# Patient Record
Sex: MALE | Race: BLACK OR AFRICAN AMERICAN | NOT HISPANIC OR LATINO | Employment: FULL TIME | URBAN - METROPOLITAN AREA
[De-identification: names, ages, dates, MRNs, and addresses within clinical notes are randomized per-mention and may not be internally consistent; named-entity substitution may affect disease eponyms.]

---

## 2019-01-28 ENCOUNTER — APPOINTMENT (OUTPATIENT)
Dept: OCCUPATIONAL MEDICINE | Facility: CLINIC | Age: 49
End: 2019-01-28

## 2019-01-28 ENCOUNTER — APPOINTMENT (OUTPATIENT)
Dept: LAB | Facility: HOSPITAL | Age: 49
End: 2019-01-28
Attending: PREVENTIVE MEDICINE

## 2019-01-28 ENCOUNTER — TRANSCRIBE ORDERS (OUTPATIENT)
Dept: OCCUPATIONAL MEDICINE | Facility: CLINIC | Age: 49
End: 2019-01-28

## 2019-01-28 DIAGNOSIS — Z02.1 PRE-EMPLOYMENT HEALTH SCREENING EXAMINATION: Primary | ICD-10-CM

## 2019-01-28 DIAGNOSIS — Z02.1 PRE-EMPLOYMENT HEALTH SCREENING EXAMINATION: ICD-10-CM

## 2019-01-28 PROCEDURE — 86765 RUBEOLA ANTIBODY: CPT

## 2019-01-28 PROCEDURE — 86735 MUMPS ANTIBODY: CPT

## 2019-01-28 PROCEDURE — 36415 COLL VENOUS BLD VENIPUNCTURE: CPT

## 2019-01-28 PROCEDURE — 86480 TB TEST CELL IMMUN MEASURE: CPT

## 2019-01-28 PROCEDURE — 86762 RUBELLA ANTIBODY: CPT

## 2019-01-28 PROCEDURE — 86787 VARICELLA-ZOSTER ANTIBODY: CPT

## 2019-01-29 LAB
MEV IGG SER QL: NORMAL
MUV IGG SER QL: NORMAL
RUBV IGG SERPL IA-ACNC: 18.7 IU/ML

## 2019-01-30 LAB
GAMMA INTERFERON BACKGROUND BLD IA-ACNC: 0.05 IU/ML
M TB IFN-G BLD-IMP: NEGATIVE
M TB IFN-G CD4+ BCKGRND COR BLD-ACNC: -0.01 IU/ML
M TB IFN-G CD4+ BCKGRND COR BLD-ACNC: -0.01 IU/ML
MITOGEN IGNF BCKGRD COR BLD-ACNC: >10 IU/ML

## 2019-01-31 LAB — VZV IGG SER IA-ACNC: NORMAL

## 2022-04-16 ENCOUNTER — APPOINTMENT (EMERGENCY)
Dept: RADIOLOGY | Facility: HOSPITAL | Age: 52
End: 2022-04-16
Payer: COMMERCIAL

## 2022-04-16 ENCOUNTER — HOSPITAL ENCOUNTER (OUTPATIENT)
Facility: HOSPITAL | Age: 52
Setting detail: OBSERVATION
Discharge: HOME | End: 2022-04-18
Attending: EMERGENCY MEDICINE | Admitting: INTERNAL MEDICINE
Payer: COMMERCIAL

## 2022-04-16 DIAGNOSIS — R55 SYNCOPE: Primary | ICD-10-CM

## 2022-04-16 DIAGNOSIS — R94.31 ABNORMAL EKG: ICD-10-CM

## 2022-04-16 DIAGNOSIS — N17.9 ACUTE KIDNEY INJURY (HCC): ICD-10-CM

## 2022-04-16 PROBLEM — D72.829 LEUKOCYTOSIS: Status: ACTIVE | Noted: 2022-04-16

## 2022-04-16 LAB
2HR DELTA HS TROPONIN: 2 NG/L
4HR DELTA HS TROPONIN: 3 NG/L
ALBUMIN SERPL BCP-MCNC: 3.8 G/DL (ref 3.5–5)
ALP SERPL-CCNC: 77 U/L (ref 46–116)
ALT SERPL W P-5'-P-CCNC: 41 U/L (ref 12–78)
ANION GAP SERPL CALCULATED.3IONS-SCNC: 7 MMOL/L (ref 4–13)
APTT PPP: 25 SECONDS (ref 23–37)
AST SERPL W P-5'-P-CCNC: 32 U/L (ref 5–45)
ATRIAL RATE: 63 BPM
ATRIAL RATE: 74 BPM
ATRIAL RATE: 75 BPM
ATRIAL RATE: 76 BPM
BACTERIA UR QL AUTO: ABNORMAL /HPF
BASOPHILS # BLD AUTO: 0.02 THOUSANDS/ΜL (ref 0–0.1)
BASOPHILS NFR BLD AUTO: 0 % (ref 0–1)
BILIRUB SERPL-MCNC: 0.42 MG/DL (ref 0.2–1)
BILIRUB UR QL STRIP: NEGATIVE
BUN SERPL-MCNC: 27 MG/DL (ref 5–25)
CALCIUM SERPL-MCNC: 9.1 MG/DL (ref 8.3–10.1)
CARDIAC TROPONIN I PNL SERPL HS: 6 NG/L
CARDIAC TROPONIN I PNL SERPL HS: 8 NG/L
CARDIAC TROPONIN I PNL SERPL HS: 9 NG/L
CHLORIDE SERPL-SCNC: 109 MMOL/L (ref 100–108)
CLARITY UR: CLEAR
CO2 SERPL-SCNC: 26 MMOL/L (ref 21–32)
COLOR UR: YELLOW
CREAT SERPL-MCNC: 1.68 MG/DL (ref 0.6–1.3)
EOSINOPHIL # BLD AUTO: 0.07 THOUSAND/ΜL (ref 0–0.61)
EOSINOPHIL NFR BLD AUTO: 1 % (ref 0–6)
ERYTHROCYTE [DISTWIDTH] IN BLOOD BY AUTOMATED COUNT: 12.7 % (ref 11.6–15.1)
GFR SERPL CREATININE-BSD FRML MDRD: 46 ML/MIN/1.73SQ M
GLUCOSE SERPL-MCNC: 94 MG/DL (ref 65–140)
GLUCOSE UR STRIP-MCNC: NEGATIVE MG/DL
HCT VFR BLD AUTO: 42.1 % (ref 36.5–49.3)
HGB BLD-MCNC: 13.8 G/DL (ref 12–17)
HGB UR QL STRIP.AUTO: NEGATIVE
HYALINE CASTS #/AREA URNS LPF: ABNORMAL /LPF
IMM GRANULOCYTES # BLD AUTO: 0.04 THOUSAND/UL (ref 0–0.2)
IMM GRANULOCYTES NFR BLD AUTO: 0 % (ref 0–2)
INR PPP: 1.08 (ref 0.84–1.19)
KETONES UR STRIP-MCNC: NEGATIVE MG/DL
LEUKOCYTE ESTERASE UR QL STRIP: NEGATIVE
LIPASE SERPL-CCNC: 64 U/L (ref 73–393)
LYMPHOCYTES # BLD AUTO: 0.91 THOUSANDS/ΜL (ref 0.6–4.47)
LYMPHOCYTES NFR BLD AUTO: 6 % (ref 14–44)
MCH RBC QN AUTO: 29.8 PG (ref 26.8–34.3)
MCHC RBC AUTO-ENTMCNC: 32.8 G/DL (ref 31.4–37.4)
MCV RBC AUTO: 91 FL (ref 82–98)
MONOCYTES # BLD AUTO: 1.03 THOUSAND/ΜL (ref 0.17–1.22)
MONOCYTES NFR BLD AUTO: 7 % (ref 4–12)
MUCOUS THREADS UR QL AUTO: ABNORMAL
NEUTROPHILS # BLD AUTO: 12.09 THOUSANDS/ΜL (ref 1.85–7.62)
NEUTS SEG NFR BLD AUTO: 86 % (ref 43–75)
NITRITE UR QL STRIP: NEGATIVE
NON-SQ EPI CELLS URNS QL MICRO: ABNORMAL /HPF
NRBC BLD AUTO-RTO: 0 /100 WBCS
P AXIS: 41 DEGREES
P AXIS: 43 DEGREES
P AXIS: 45 DEGREES
P AXIS: 47 DEGREES
PH UR STRIP.AUTO: 5.5 [PH]
PLATELET # BLD AUTO: 221 THOUSANDS/UL (ref 149–390)
PMV BLD AUTO: 9.6 FL (ref 8.9–12.7)
POTASSIUM SERPL-SCNC: 4.3 MMOL/L (ref 3.5–5.3)
PR INTERVAL: 170 MS
PR INTERVAL: 176 MS
PROT SERPL-MCNC: 7.5 G/DL (ref 6.4–8.2)
PROT UR STRIP-MCNC: ABNORMAL MG/DL
PROTHROMBIN TIME: 13.6 SECONDS (ref 11.6–14.5)
QRS AXIS: 29 DEGREES
QRS AXIS: 33 DEGREES
QRS AXIS: 33 DEGREES
QRS AXIS: 34 DEGREES
QRSD INTERVAL: 80 MS
QRSD INTERVAL: 82 MS
QT INTERVAL: 364 MS
QT INTERVAL: 376 MS
QT INTERVAL: 376 MS
QT INTERVAL: 388 MS
QTC INTERVAL: 397 MS
QTC INTERVAL: 409 MS
QTC INTERVAL: 417 MS
QTC INTERVAL: 419 MS
RBC # BLD AUTO: 4.63 MILLION/UL (ref 3.88–5.62)
RBC #/AREA URNS AUTO: ABNORMAL /HPF
SODIUM SERPL-SCNC: 142 MMOL/L (ref 136–145)
SP GR UR STRIP.AUTO: >=1.03 (ref 1–1.03)
T WAVE AXIS: -1 DEGREES
T WAVE AXIS: -3 DEGREES
T WAVE AXIS: -6 DEGREES
T WAVE AXIS: 1 DEGREES
UROBILINOGEN UR QL STRIP.AUTO: 0.2 E.U./DL
VENTRICULAR RATE: 63 BPM
VENTRICULAR RATE: 74 BPM
VENTRICULAR RATE: 75 BPM
VENTRICULAR RATE: 76 BPM
WBC # BLD AUTO: 14.16 THOUSAND/UL (ref 4.31–10.16)
WBC #/AREA URNS AUTO: ABNORMAL /HPF

## 2022-04-16 PROCEDURE — 99220 PR INITIAL OBSERVATION CARE/DAY 70 MINUTES: CPT | Performed by: INTERNAL MEDICINE

## 2022-04-16 PROCEDURE — 99285 EMERGENCY DEPT VISIT HI MDM: CPT | Performed by: PHYSICIAN ASSISTANT

## 2022-04-16 PROCEDURE — 83690 ASSAY OF LIPASE: CPT | Performed by: PHYSICIAN ASSISTANT

## 2022-04-16 PROCEDURE — 96360 HYDRATION IV INFUSION INIT: CPT

## 2022-04-16 PROCEDURE — 99285 EMERGENCY DEPT VISIT HI MDM: CPT

## 2022-04-16 PROCEDURE — 85025 COMPLETE CBC W/AUTO DIFF WBC: CPT | Performed by: PHYSICIAN ASSISTANT

## 2022-04-16 PROCEDURE — 81001 URINALYSIS AUTO W/SCOPE: CPT | Performed by: INTERNAL MEDICINE

## 2022-04-16 PROCEDURE — 71045 X-RAY EXAM CHEST 1 VIEW: CPT

## 2022-04-16 PROCEDURE — 93005 ELECTROCARDIOGRAM TRACING: CPT

## 2022-04-16 PROCEDURE — 85610 PROTHROMBIN TIME: CPT | Performed by: PHYSICIAN ASSISTANT

## 2022-04-16 PROCEDURE — 85730 THROMBOPLASTIN TIME PARTIAL: CPT | Performed by: PHYSICIAN ASSISTANT

## 2022-04-16 PROCEDURE — 93010 ELECTROCARDIOGRAM REPORT: CPT | Performed by: INTERNAL MEDICINE

## 2022-04-16 PROCEDURE — 84484 ASSAY OF TROPONIN QUANT: CPT | Performed by: PHYSICIAN ASSISTANT

## 2022-04-16 PROCEDURE — 36415 COLL VENOUS BLD VENIPUNCTURE: CPT | Performed by: PHYSICIAN ASSISTANT

## 2022-04-16 PROCEDURE — 80053 COMPREHEN METABOLIC PANEL: CPT | Performed by: PHYSICIAN ASSISTANT

## 2022-04-16 RX ORDER — HEPARIN SODIUM 5000 [USP'U]/ML
5000 INJECTION, SOLUTION INTRAVENOUS; SUBCUTANEOUS EVERY 8 HOURS SCHEDULED
Status: DISCONTINUED | OUTPATIENT
Start: 2022-04-16 | End: 2022-04-18 | Stop reason: HOSPADM

## 2022-04-16 RX ORDER — SODIUM CHLORIDE 9 MG/ML
100 INJECTION, SOLUTION INTRAVENOUS CONTINUOUS
Status: DISCONTINUED | OUTPATIENT
Start: 2022-04-16 | End: 2022-04-17

## 2022-04-16 RX ORDER — ASPIRIN 81 MG/1
324 TABLET, CHEWABLE ORAL ONCE
Status: COMPLETED | OUTPATIENT
Start: 2022-04-16 | End: 2022-04-16

## 2022-04-16 RX ADMIN — HEPARIN SODIUM 5000 UNITS: 5000 INJECTION INTRAVENOUS; SUBCUTANEOUS at 22:48

## 2022-04-16 RX ADMIN — SODIUM CHLORIDE 100 ML/HR: 0.9 INJECTION, SOLUTION INTRAVENOUS at 16:59

## 2022-04-16 RX ADMIN — SODIUM CHLORIDE 1000 ML: 0.9 INJECTION, SOLUTION INTRAVENOUS at 12:09

## 2022-04-16 RX ADMIN — ASPIRIN 81 MG 324 MG: 81 TABLET ORAL at 12:09

## 2022-04-16 NOTE — ASSESSMENT & PLAN NOTE
Likely reactive, no evidence of infection, afebrile  Check UA  Chest x-ray  no consolidation  Monitor CBC

## 2022-04-16 NOTE — ASSESSMENT & PLAN NOTE
No baseline creatinine  Suspect due to hypovolemia  On IV fluids  Avoid nephrotoxins/IV contrast  Monitor BMP daily

## 2022-04-16 NOTE — H&P
[address] 1970, 49 y o  male MRN: [id number]  Unit/Bed#: ED 11 Encounter: [id number]  Primary Care Provider: No primary care provider on file  Date and time admitted to hospital: 4/16/2022 11:14 AM    * Syncope  Assessment & Plan  Patient presents with abdominal pain, nausea, profuse watery BM followed by syncope while getting to vomit  Suspect orthostatic hypotension versus Arrhythmia versus vagal  -cardiac monitoring  -Trend troponin  -check orthostatic blood pressure  -Fall precaution  -IV fluids  -check echo  -cardiology consult      Leukocytosis  Assessment & Plan  Likely reactive, no evidence of infection, afebrile  Check UA  Chest x-ray  no consolidation  Monitor CBC    MARCIAL (acute kidney injury) ([facility] [address] )  Assessment & Plan  No baseline creatinine  Suspect due to hypovolemia  On IV fluids  Avoid nephrotoxins/IV contrast  Monitor BMP daily    VTE Pharmacologic Prophylaxis: VTE Score: 5 High Risk (Score >/= 5) - Pharmacological DVT Prophylaxis Ordered: heparin  Sequential Compression Devices Ordered  Code Status: Level 1 - Full Code   Discussion with family: Patient declined call to   Anticipated Length of Stay: Patient will be admitted on an observation basis with an anticipated length of stay of less than 2 midnights secondary to Syncope  Total Time for Visit, including Counseling / Coordination of Care: 45 minutes Greater than 50% of this total time spent on direct patient counseling and coordination of care  Chief Complaint:  Nausea and passed out    History of Present Illness:  Ismael Rush is a 49 y o  male with no significant past medical history who presents with syncopal episode while at work  7:30 a m  while at work, he developed upset stomach went to bathroom had a large volume loose stool BM    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, 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, developed crampy epigastric pain, became diaphoretic, visual disturbance, nauseous and while getting up to vomit passed out landed on his knees and was assisted by co-worker who called EMS  He denies chest pain, shortness a breath, fever, chills, dysuria, urinary frequency, history of syncope  Patient exercises regularly daily  Review of Systems:  Review of Systems  Comprehensive review of systems negative except in above HPI  Past Medical and Surgical History:   Past Medical History:   Diagnosis Date    No pertinent past medical history        Past Surgical History:   Procedure Laterality Date    VASECTOMY N/A        Meds/Allergies:  Prior to Admission medications    Not on File     I have reviewed home medications with patient personally  Allergies: No Known Allergies    Social History:  Marital Status:    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  Patient Pre-hospital Living Situation: With other family member: Girlfriend  Patient Pre-hospital Level of Mobility: walks  Patient Pre-hospital Diet Restrictions:   Substance Use History:   Social History     Substance and Sexual Activity   Alcohol Use Yes    Comment: occasional     Social History     Tobacco Use   Smoking Status Never Smoker   Smokeless Tobacco Never Used     Social History     Substance and Sexual Activity   Drug Use Never       Family History:  History reviewed  No pertinent family history  Physical Exam:     Vitals:   Blood Pressure: 129/77 (04/16/22 1430)  Pulse: 70 (04/16/22 1430)  Temperature: 98 1 °F (36 7 °C) (04/16/22 1115)  Temp Source: Oral (04/16/22 1115)  Respirations: 16 (04/16/22 1430)  Height: 5' 8" (172 7 cm) (04/16/22 1130)  Weight - Scale: 132 kg (290 lb) (04/16/22 1130)  SpO2: 97 % (04/16/22 1430)    Physical Exam  Vitals and nursing note reviewed  Constitutional:       Appearance: Normal appearance  He is well-developed  He is not toxic-appearing  HENT:      Head: Normocephalic and atraumatic     Eyes:      Conjunctiva/sclera: Conjunctivae normal  Pupils: Pupils are equal, round, and reactive to light  Cardiovascular:      Rate and Rhythm: Normal rate and regular rhythm  Pulses: Normal pulses  Heart sounds: Normal heart sounds  No murmur heard  Pulmonary:      Effort: Pulmonary effort is normal  No respiratory distress  Breath sounds: Normal breath sounds  Abdominal:      General: Abdomen is flat  Bowel sounds are normal  There is no distension  Palpations: Abdomen is soft  Tenderness: There is no abdominal tenderness  Musculoskeletal:      Cervical back: Neck supple  Right lower leg: No edema  Left lower leg: No edema  Skin:     General: Skin is warm and dry  Neurological:      General: No focal deficit present  Mental Status: He is alert and oriented to person, place, and time  Sensory: No sensory deficit  Motor: No weakness  Coordination: Coordination normal       Deep Tendon Reflexes: Reflexes normal       Comments: Finger-to-nose intact   Psychiatric:         Mood and Affect: Mood normal          Behavior: Behavior normal           Additional Data:     Lab Results:  Results from last 7 days   Lab Units 04/16/22  1200   WBC Thousand/uL 14 16*   HEMOGLOBIN g/dL 13 8   HEMATOCRIT % 42 1   PLATELETS Thousands/uL 221   NEUTROS PCT % 86*   LYMPHS PCT % 6*   MONOS PCT % 7   EOS PCT % 1     Results from last 7 days   Lab Units 04/16/22  1201   SODIUM mmol/L 142   POTASSIUM mmol/L 4 3   CHLORIDE mmol/L 109*   CO2 mmol/L 26   BUN mg/dL 27*   CREATININE mg/dL 1 68*   ANION GAP mmol/L 7   CALCIUM mg/dL 9 1   ALBUMIN g/dL 3 8   TOTAL BILIRUBIN mg/dL 0 42   ALK PHOS U/L 77   ALT U/L 41   AST U/L 32   GLUCOSE RANDOM mg/dL 94     Results from last 7 days   Lab Units 04/16/22  1201   INR  1 08                   Imaging: Reviewed radiology reports from this admission including: chest xray  XR chest 1 view portable    (Results Pending)       EKG and Other Studies Reviewed on Admission:   · EKG: NSR   HR 74bpm  ST elevation in I, avl, twi in twi (no old ekg for comparison)    ** Please Note: This note has been constructed using a voice recognition system   **

## 2022-04-16 NOTE — ED NOTES
Ortho BP  Laying- /57 HR 67  Sitting- /60 HR 61  Standing-/95 HR 71      [place], [address] [place]  04/16/22 1628

## 2022-04-16 NOTE — PLAN OF CARE
Problem: PAIN - ADULT  Goal: Verbalizes/displays adequate comfort level or baseline comfort level  Description: Interventions:  - Encourage patient to monitor pain and request assistance  - Assess pain using appropriate pain scale  - Administer analgesics based on type and severity of pain and evaluate response  - Implement non-pharmacological measures as appropriate and evaluate response  - Consider cultural and social influences on pain and pain management  - Notify physician/advanced practitioner if interventions unsuccessful or patient reports new pain  Outcome: Progressing     Problem: INFECTION - ADULT  Goal: Absence or prevention of progression during hospitalization  Description: INTERVENTIONS:  - Assess and monitor for signs and symptoms of infection  - Monitor lab/diagnostic results  - Monitor all insertion sites, i e  indwelling lines, tubes, and drains  - Monitor endotracheal if appropriate and nasal secretions for changes in amount and color  - [place] appropriate cooling/warming therapies per order  - Administer medications as ordered  - Instruct and encourage patient and family to use good hand hygiene technique  - Identify and instruct in appropriate isolation precautions for identified infection/condition  Outcome: Progressing  Goal: Absence of fever/infection during neutropenic period  Description: INTERVENTIONS:  - Monitor WBC    Outcome: Progressing     Problem: SAFETY ADULT  Goal: Patient will remain free of falls  Description: INTERVENTIONS:  - Educate patient/family on patient safety including physical limitations  - Instruct patient to call for assistance with activity   - Consult OT/PT to assist with strengthening/mobility   - Keep Call bell within reach  - Keep bed low and locked with side rails adjusted as appropriate  - Keep care items and personal belongings within reach  - Initiate and maintain comfort rounds  - Make Fall Risk Sign visible to staff  - Offer Toileting every 2 Hours, in advance of need  - Initiate/Maintain bed alarm  - Obtain necessary fall risk management equipment: bed   - Apply yellow socks and bracelet for high fall risk patients  - Consider moving patient to room near nurses station  Outcome: Progressing  Goal: Maintain or return to baseline ADL function  Description: INTERVENTIONS:  -  Assess patient's ability to carry out ADLs; assess patient's baseline for ADL function and identify physical deficits which impact ability to perform ADLs (bathing, care of mouth/teeth, toileting, grooming, dressing, etc )  - Assess/evaluate cause of self-care deficits   - Assess range of motion  - Assess patient's mobility; develop plan if impaired  - Assess patient's need for assistive devices and provide as appropriate  - Encourage maximum independence but intervene and supervise when necessary  - Involve family in performance of ADLs  - Assess for home care needs following discharge   - Consider OT consult to assist with ADL evaluation and planning for discharge  - Provide patient education as appropriate  Outcome: Progressing  Goal: Maintains/Returns to pre admission functional level  Description: INTERVENTIONS:  - Perform BMAT or MOVE assessment daily    - Set and communicate daily mobility goal to care team and patient/family/caregiver  - Collaborate with rehabilitation services on mobility goals if consulted  - Perform Range of Motion  times a day  - Reposition patient every 3 hours    - Dangle patient 3 times a day  - Stand patient 3 times a day  - Ambulate patient 3 times a day  - Out of bed to chair 3 times a day   - Out of bed for meals 3 times a day  - Out of bed for toileting  - Record patient progress and toleration of activity level   Outcome: Progressing     Problem: DISCHARGE PLANNING  Goal: Discharge to home or other facility with appropriate resources  Description: INTERVENTIONS:  - Identify barriers to discharge w/patient and caregiver  - Arrange for needed discharge resources and transportation as appropriate  - Identify discharge learning needs (meds, wound care, etc )  - Arrange for interpretive services to assist at discharge as needed  - Refer to Case Management Department for coordinating discharge planning if the patient needs post-hospital services based on physician/advanced practitioner order or complex needs related to functional status, cognitive ability, or social support system  Outcome: Progressing     Problem: Knowledge Deficit  Goal: Patient/family/caregiver demonstrates understanding of disease process, treatment plan, medications, and discharge instructions  Description: Complete learning assessment and assess knowledge base    Interventions:  - Provide teaching at level of understanding  - Provide teaching via preferred learning methods  Outcome: Progressing     Problem: CARDIOVASCULAR - ADULT  Goal: Maintains optimal cardiac output and hemodynamic stability  Description: INTERVENTIONS:  - Monitor I/O, vital signs and rhythm  - Monitor for S/S and trends of decreased cardiac output  - Administer and titrate ordered vasoactive medications to optimize hemodynamic stability  - Assess quality of pulses, skin color and temperature  - Assess for signs of decreased coronary artery perfusion  - Instruct patient to report change in severity of symptoms  Outcome: Progressing  Goal: Absence of cardiac dysrhythmias or at baseline rhythm  Description: INTERVENTIONS:  - Continuous cardiac monitoring, vital signs, obtain 12 lead EKG if ordered  - Administer antiarrhythmic and heart rate control medications as ordered  - Monitor electrolytes and administer replacement therapy as ordered  Outcome: Progressing     Problem: GASTROINTESTINAL - ADULT  Goal: Minimal or absence of nausea and/or vomiting  Description: INTERVENTIONS:  - Administer IV fluids if ordered to ensure adequate hydration  - Maintain NPO status until nausea and vomiting are resolved  - Nasogastric tube if ordered  - Administer ordered antiemetic medications as needed  - Provide nonpharmacologic comfort measures as appropriate  - Advance diet as tolerated, if ordered  - Consider nutrition services referral to assist patient with adequate nutrition and appropriate food choices  Outcome: Progressing  Goal: Maintains or returns to baseline bowel function  Description: INTERVENTIONS:  - Assess bowel function  - Encourage oral fluids to ensure adequate hydration  - Administer IV fluids if ordered to ensure adequate hydration  - Administer ordered medications as needed  - Encourage mobilization and activity  - Consider nutritional services referral to assist patient with adequate nutrition and appropriate food choices  Outcome: Progressing

## 2022-04-17 LAB
ANION GAP SERPL CALCULATED.3IONS-SCNC: 7 MMOL/L (ref 4–13)
BUN SERPL-MCNC: 21 MG/DL (ref 5–25)
CALCIUM SERPL-MCNC: 8.5 MG/DL (ref 8.3–10.1)
CHLORIDE SERPL-SCNC: 109 MMOL/L (ref 100–108)
CHOLEST SERPL-MCNC: 212 MG/DL
CO2 SERPL-SCNC: 24 MMOL/L (ref 21–32)
CREAT SERPL-MCNC: 1.34 MG/DL (ref 0.6–1.3)
ERYTHROCYTE [DISTWIDTH] IN BLOOD BY AUTOMATED COUNT: 12.8 % (ref 11.6–15.1)
EST. AVERAGE GLUCOSE BLD GHB EST-MCNC: 108 MG/DL
GFR SERPL CREATININE-BSD FRML MDRD: 61 ML/MIN/1.73SQ M
GLUCOSE SERPL-MCNC: 106 MG/DL (ref 65–140)
HBA1C MFR BLD: 5.4 %
HCT VFR BLD AUTO: 39.8 % (ref 36.5–49.3)
HDLC SERPL-MCNC: 66 MG/DL
HGB BLD-MCNC: 13.3 G/DL (ref 12–17)
LDLC SERPL CALC-MCNC: 129 MG/DL (ref 0–100)
MCH RBC QN AUTO: 30.2 PG (ref 26.8–34.3)
MCHC RBC AUTO-ENTMCNC: 33.4 G/DL (ref 31.4–37.4)
MCV RBC AUTO: 91 FL (ref 82–98)
NONHDLC SERPL-MCNC: 146 MG/DL
PLATELET # BLD AUTO: 196 THOUSANDS/UL (ref 149–390)
PMV BLD AUTO: 9.5 FL (ref 8.9–12.7)
POTASSIUM SERPL-SCNC: 4 MMOL/L (ref 3.5–5.3)
RBC # BLD AUTO: 4.4 MILLION/UL (ref 3.88–5.62)
SODIUM SERPL-SCNC: 140 MMOL/L (ref 136–145)
TRIGL SERPL-MCNC: 87 MG/DL
WBC # BLD AUTO: 8.48 THOUSAND/UL (ref 4.31–10.16)

## 2022-04-17 PROCEDURE — 99226 PR SBSQ OBSERVATION CARE/DAY 35 MINUTES: CPT | Performed by: FAMILY MEDICINE

## 2022-04-17 PROCEDURE — 80048 BASIC METABOLIC PNL TOTAL CA: CPT | Performed by: INTERNAL MEDICINE

## 2022-04-17 PROCEDURE — 83036 HEMOGLOBIN GLYCOSYLATED A1C: CPT | Performed by: INTERNAL MEDICINE

## 2022-04-17 PROCEDURE — 85027 COMPLETE CBC AUTOMATED: CPT | Performed by: INTERNAL MEDICINE

## 2022-04-17 PROCEDURE — 80061 LIPID PANEL: CPT | Performed by: INTERNAL MEDICINE

## 2022-04-17 PROCEDURE — 99205 OFFICE O/P NEW HI 60 MIN: CPT | Performed by: INTERNAL MEDICINE

## 2022-04-17 RX ADMIN — HEPARIN SODIUM 5000 UNITS: 5000 INJECTION INTRAVENOUS; SUBCUTANEOUS at 07:02

## 2022-04-17 RX ADMIN — HEPARIN SODIUM 5000 UNITS: 5000 INJECTION INTRAVENOUS; SUBCUTANEOUS at 22:06

## 2022-04-17 RX ADMIN — HEPARIN SODIUM 5000 UNITS: 5000 INJECTION INTRAVENOUS; SUBCUTANEOUS at 13:19

## 2022-04-17 RX ADMIN — SODIUM CHLORIDE 100 ML/HR: 0.9 INJECTION, SOLUTION INTRAVENOUS at 03:50

## 2022-04-17 NOTE — ASSESSMENT & PLAN NOTE
· Orthostatics normal  · Telemetry shows no arrhythmias  · Cardiology planning for stress test tomorrow  · Pending echo  · Continue telemetry

## 2022-04-17 NOTE — UTILIZATION REVIEW
Initial Clinical Review    Admission: Date/Time/Statement:   Admission Orders (From admission, onward)     Ordered        04/16/22 1258  Place in Observation  Once                      Orders Placed This Encounter   Procedures    Place in Observation     Standing Status:   Standing     Number of Occurrences:   1     Order Specific Question:   Level of Care     Answer:   Med Surg [16]     ED Arrival Information     Expected Arrival Acuity    - 4/16/2022 11:13 Urgent         Means of arrival Escorted by Service Admission type    Ambulance [facility]) Hospitalist Urgent         Arrival complaint    ABD PAIN        Chief Complaint   Patient presents with    Abdominal Pain     pt brought in EMS for mid lower abdominal pain, nausea, and light headedness        Initial Presentation: 49 y o  male  with no significant PMH who presents to the ED s/p syncopal episode while at work  This morning while at work, he developed an upset stomach, had a large volume loose stool BM  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, 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, developed crampy epigastric pain, became diaphoretic with visual disturbance, stood up and  landed on his knees  4/16 Plan: Admit to Observation for evaluation and treatment of syncope and MARCIAL:  Suspect orthostatic hypotension vs  Arrhythmia vs  Vagal:  cardiac monitoring, check orthostatic blood pressure, IV fluids, ECHO, consult Cardiology  No baseline creatinine available, monitor BMP daily  4/17 Cardiology consult:  Syncope, likely vasovagal in the setting of nausea/diarrhea  Check orthostatic blood pressures, TTE ordered and pending, exercise stress test to be performed tomorrow  Internal Medicine: No further syncopal events  Orthostatics normal, MARCIAL improving  Continue telemetry           ED Triage Vitals   Temperature Pulse Respirations Blood Pressure SpO2   04/16/22 1115 04/16/22 1115 04/16/22 1115 04/16/22 1115 04/16/22 1115   98 1 °F (36 7 °C) 84 18 117/70 97 %      Temp Source Heart Rate Source Patient Position - Orthostatic VS BP Location FiO2 (%)   04/16/22 1115 04/16/22 1115 04/16/22 1115 04/16/22 1115 --   Oral Monitor Lying Right arm       Pain Score       04/16/22 1315       3          Wt Readings from Last 1 Encounters:   04/16/22 132 kg (290 lb)     Additional Vital Signs:       Date/Time Temp Pulse Resp BP MAP (mmHg) SpO2 O2 Device Patient Position - Orthostatic VS   04/17/22 15:05:51 98 2 °F (36 8 °C) 75 17 119/72 88 93 % -- --   04/17/22 11:21:43 97 9 °F (36 6 °C) 68 16 111/78 89 94 % -- --       Date/Time Temp Pulse Resp BP MAP (mmHg) SpO2 O2 Device Patient Position - Orthostatic VS   04/17/22 1006 98 9 °F (37 2 °C) -- 20 142/86 -- 96 % -- Standing - Orthostatic VS   04/17/22 1005 98 9 °F (37 2 °C) -- 18 134/80 89 97 % -- Sitting - Orthostatic VS   04/17/22 1002 98 9 °F (37 2 °C) -- 17 135/81 92 92 % -- Lying - Orthostatic VS   04/17/22 09:59:04 -- 75 20 142/86 105 94 % -- --   04/17/22 07:31:15 98 5 °F (36 9 °C) 57 21 120/82 95 96 % -- --   04/17/22 0411 -- -- -- -- -- 96 % -- Lying   04/17/22 03:15:06 -- 73 20 152/92 112 95 % -- --   04/16/22 1930 -- -- -- -- -- 97 % None (Room air) --   04/16/22 19:17:03 98 1 °F (36 7 °C) 71 -- 154/84 107 96 % -- --   04/16/22 18:21:01 98 7 °F (37 1 °C) 64 17 154/82 106 97 % -- --   04/16/22 1430 -- 70 16 129/77 97 97 % None (Room air) Lying   04/16/22 1415 -- 77 18 130/76 98 99 % None (Room air) Lying   04/16/22 1400 -- 81 18 131/74 97 97 % None (Room air) Lying   04/16/22 1345 -- 72 14 132/72 97 98 % None (Room air) Lying   04/16/22 1330 -- 66 16 128/71 95 98 % None (Room air) Lying   04/16/22 1315 -- 59 12 124/64 89 100 % None (Room air) Lying   04/16/22 1245 -- 68 15 116/66 85 98 % None (Room air) Lying   04/16/22 1230 -- 81 14 125/66 90 98 % None (Room air) Lying   04/16/22 1215 -- 67 14 123/58 84 100 % None (Room air) Lying   04/16/22 1130 -- 84 18 111/61 76 98 % None (Room air) Lying     1622 ED Ortho BP  Laying- /57 HR 67  Sitting- /60 HR 61  Standing-/95 HR 71         Pertinent Labs/Diagnostic Test Results:   XR chest 1 view portable   Final Result by Marcy Burciaga MD (04/17 0840)      No acute cardiopulmonary disease                    Workstation performed: [id number]             4/16 repeat EKG:    Normal sinus rhythm with sinus arrhythmia  ST elevation, consider early repolarization, pericarditis, or injury  Abnormal ECG  When compared with ECG of 16-APR-2022 11:50, (unconfirmed)  No significant change was found    4/16 EKG:      Normal sinus rhythm  ST elevation, consider early repolarization, pericarditis, or injury  No previous ECGs available          Results from last 7 days   Lab Units 04/17/22  0433 04/16/22  1200   WBC Thousand/uL 8 48 14 16*   HEMOGLOBIN g/dL 13 3 13 8   HEMATOCRIT % 39 8 42 1   PLATELETS Thousands/uL 196 221   NEUTROS ABS Thousands/µL  --  12 09*         Results from last 7 days   Lab Units 04/17/22  0433 04/16/22  1201   SODIUM mmol/L 140 142   POTASSIUM mmol/L 4 0 4 3   CHLORIDE mmol/L 109* 109*   CO2 mmol/L 24 26   ANION GAP mmol/L 7 7   BUN mg/dL 21 27*   CREATININE mg/dL 1 34* 1 68*   EGFR ml/min/1 73sq m 61 46   CALCIUM mg/dL 8 5 9 1     Results from last 7 days   Lab Units 04/16/22  1201   AST U/L 32   ALT U/L 41   ALK PHOS U/L 77   TOTAL PROTEIN g/dL 7 5   ALBUMIN g/dL 3 8   TOTAL BILIRUBIN mg/dL 0 42         Results from last 7 days   Lab Units 04/17/22  0433 04/16/22  1201   GLUCOSE RANDOM mg/dL 106 94         Results from last 7 days   Lab Units 04/16/22  1625 04/16/22  1436 04/16/22  1201   HS TNI 0HR ng/L  --   --  6   HS TNI 2HR ng/L  --  8  --    HSTNI D2 ng/L  --  2  --    HS TNI 4HR ng/L 9  --   --    HSTNI D4 ng/L 3  --   --          Results from last 7 days   Lab Units 04/16/22  1201   PROTIME seconds 13 6   INR  1 08   PTT seconds 25         Results from last 7 days   Lab Units 04/16/22  1201   LIPASE u/L 64*                 Results from last 7 days   Lab Units 04/16/22  1701   CLARITY UA  Clear   COLOR UA  Yellow   SPEC GRAV UA  >=1 030   PH UA  5 5   GLUCOSE UA mg/dl Negative   KETONES UA mg/dl Negative   BLOOD UA  Negative   PROTEIN UA mg/dl Trace*   NITRITE UA  Negative   BILIRUBIN UA  Negative   UROBILINOGEN UA E U /dl 0 2   LEUKOCYTES UA  Negative   WBC UA /hpf 0-1   RBC UA /hpf None Seen   BACTERIA UA /hpf None Seen   EPITHELIAL CELLS WET PREP /hpf None Seen   MUCUS THREADS  Occasional*         ED Treatment:   Medication Administration from 04/16/2022 1112 to 04/16/2022 1811       Date/Time Order Dose Route Action     04/16/2022 1625 sodium chloride 0 9 % bolus 1,000 mL 0 mL Intravenous Stopped     04/16/2022 1209 sodium chloride 0 9 % bolus 1,000 mL 1,000 mL Intravenous New Bag     04/16/2022 1209 aspirin chewable tablet 324 mg 324 mg Oral Given     04/16/2022 1702 heparin (porcine) subcutaneous injection 5,000 Units 5,000 Units Subcutaneous Not Given     04/16/2022 1659 sodium chloride 0 9 % infusion 100 mL/hr Intravenous New Bag        Past Medical History:   Diagnosis Date    No pertinent past medical history        Admitting Diagnosis: Syncope [R55]  Abdominal pain [R10 9]  Abnormal EKG [R94 31]  Acute kidney injury ([facility] [address] ) [N17 9]  Age/Sex: 49 y o  male       Admission Orders:    Telemetry, orthostatic blood pressure, SCD  Scheduled Medications:  heparin (porcine), 5,000 Units, Subcutaneous, Q[address]H [address]      Continuous IV Infusions:    sodium chloride 0 9 % infusion  Rate: 100 mL/hr Dose: 100 mL/hr  Freq: Continuous Route: IV  Indications of Use: IV Hydration  Start: 04/16/22 1630 End: 04/17/22 1012         PRN Meds: None  IP CONSULT TO CARDIOLOGY    Network Utilization Review Department  ATTENTION: Please call with any questions or concerns to [phone] and carefully listen to the prompts so that you are directed to the right person   All voicemails are confidential   Phuong Olivia all requests for admission clinical reviews, approved or denied determinations and any other requests to dedicated fax number below belonging to the campus where the patient is receiving treatment   List of dedicated fax numbers for the Facilities:  [address] [place] [address] DENIALS (Administrative/Medical Necessity) [phone]   [address]  [address] (Maternity/NICU/Pediatrics) [phone] [address]  [address] [facility] [place] [address] [place] [address] [place] [address] [address] [facility] [place] [address] [phone]

## 2022-04-17 NOTE — PROGRESS NOTES
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 is a 49y o  year old male who has no significant past medical who presented to the emergency room yesterday afternoon with complaints of a syncopal episode while at work the morning of admission  Patient stated that he developed an upset stomach went to the bathroom and had a large volume, loose bowel movement and after returning to work became nauseous with epigastric pain, diaphoretic and while getting up to go to the bathroom, lost consciousness  He denies CP, SOB or other recent syncope episodes  EMS was summoned and he was brought to the emergency room for further evaluation  He states he exercises regularly for 409 minutes of cardio and has no symptoms while exercising  In the emergency room his creatinine was noted to be elevated at 1 6, has since improved to 1 3 today  Troponin negative x3  EKG revealed normal sinus rhythm with ST elevations consistent with pericarditis early repolarization      REVIEW OF SYSTEMS:  Constitutional:  Denies fever or chills, +syncope   Eyes:  Denies change in visual acuity   HENT:  Denies nasal congestion or sore throat   Respiratory:  Denies cough, orthopnea, PND or shortness of breath   Cardiovascular:  Denies chest pain, palpitations or edema   GI:  + abdominal pain, nausea, vomiting, and diarrhea, denies bloody stools   :  Denies dysuria, frequency, difficulty in micturition and nocturia  Musculoskeletal:  Denies back pain or joint pain   Neurologic:  Denies headache, focal weakness or sensory changes   Endocrine:  Denies polyuria or polydipsia   Lymphatic:  Denies swollen glands   Psychiatric:  Denies depression or anxiety     Historical Information   Past Medical History:   Diagnosis Date    No pertinent past medical history      Past Surgical History:   Procedure Laterality Date    VASECTOMY N/A      Social History     Substance and Sexual Activity   Alcohol Use Yes    Comment: occasional     Social History     Substance and Sexual Activity   Drug Use Never     Social History     Tobacco Use   Smoking Status Never Smoker   Smokeless Tobacco Never Used       Family History: History reviewed  No pertinent family history  MEDS & ALLERGIES:  all current active meds have been reviewed and current meds:   Current Facility-Administered Medications   Medication Dose Route Frequency    heparin (porcine) subcutaneous injection 5,000 Units  5,000 Units Subcutaneous Q8H [address]    sodium chloride 0 9 % infusion  100 mL/hr Intravenous Continuous     sodium chloride, 100 mL/hr, Last Rate: 100 mL/hr (22 0350)      No Known Allergies    OBJECTIVE:  Vitals:   Vitals:    22 0731   BP: 120/82   Pulse: 57   Resp: 21   Temp: 98 5 °F (36 9 °C)   SpO2: 96%     Body mass index is 44 09 kg/m²      Systolic (18VLW), RKH:773 , Min:111 , KT     Diastolic (79BCY), PHQ:05, Min:58, Max:92      Intake/Output Summary (Last 24 hours) at 2022 0806  Last data filed at 2022 0411  Gross per 24 hour   Intake 3103 33 ml   Output 1225 ml   Net 1878 33 ml     Weight (last 2 days)     Date/Time Weight    22 1130 132 (290)        Invasive Devices  Report    Peripheral Intravenous Line            Peripheral IV 22 Left Antecubital <1 day                PHYSICAL EXAMS:  General:  Patient is not in acute distress, laying in the bed comfortably, awake, alert   Head: Normocephalic, Atraumatic  HEENT: White sclera, pink conjunctiva  Neck:  Supple, no neck vein distention  Respiratory: clear to auscultation   Cardiovascular:  PMI normal, S1-S2 normal, no murmurs, thrills, gallops, rubs, regular rhythm  GI:  Abdomen soft, non-tender, non-distended  Extremities: No edema, normal pulses  Integument:  No skin rashes or ulceration  Lymphatic:  No cervical or inguinal lymphadenopathy  Neurologic:  Patient is awake alert, responding to command, oriented to person, place and time     LABORATORY RESULTS:      CBC with diff:   Results from last 7 days   Lab Units 04/17/22  0433 04/16/22  1200   WBC Thousand/uL 8 48 14 16*   HEMOGLOBIN g/dL 13 3 13 8   HEMATOCRIT % 39 8 42 1   MCV fL 91 91   PLATELETS Thousands/uL 196 221   MCH pg 30 2 29 8   MCHC g/dL 33 4 32 8   RDW % 12 8 12 7   MPV fL 9 5 9 6   NRBC AUTO /100 WBCs  --  0       CMP:  Results from last 7 days   Lab Units 04/17/22  0433 04/16/22  1201   POTASSIUM mmol/L 4 0 4 3   CHLORIDE mmol/L 109* 109*   CO2 mmol/L 24 26   BUN mg/dL 21 27*   CREATININE mg/dL 1 34* 1 68*   CALCIUM mg/dL 8 5 9 1   AST U/L  --  32   ALT U/L  --  41   ALK PHOS U/L  --  77   EGFR ml/min/1 73sq m 61 46       BMP:  Results from last 7 days   Lab Units 04/17/22  0433 04/16/22  1201   POTASSIUM mmol/L 4 0 4 3   CHLORIDE mmol/L 109* 109*   CO2 mmol/L 24 26   BUN mg/dL 21 27*   CREATININE mg/dL 1 34* 1 68*   CALCIUM mg/dL 8 5 9 1                        Results from last 7 days   Lab Units 04/16/22  1201   INR  1 08       Lipid Profile:   No results found for: CHOL  Lab Results   Component Value Date    HDL 66 04/17/2022     Lab Results   Component Value Date    LDLCALC 129 (H) 04/17/2022     Lab Results   Component Value Date    TRIG 87 04/17/2022       Cardiac testing:   No results found for this or any previous visit  No results found for this or any previous visit  No valid procedures specified    No results found for this or any previous visit         Imaging:   I have personally reviewed pertinent reports          EKG reviewed personally:  Normal sinus rhythm with ST elevations consistent with early repolarization or pericarditis    Telemetry reviewed personally:   Sinus rhythm in the 60's       Code Status: Level 1 - Full Code      NIYAH Greenwood  4/17/2022,8:06 AM

## 2022-04-17 NOTE — PROGRESS NOTES
[facility]  Progress Note - [address] 1970, 49 y o  male MRN: [id number]  Unit/Bed#: -Zach Encounter: [id number]  Primary Care Provider: No primary care provider on file  Date and time admitted to hospital: 2022 11:14 AM    * Syncope  Assessment & Plan  · Orthostatics normal  · Telemetry shows no arrhythmias  · Cardiology planning for stress test tomorrow  · Pending echo  · Continue telemetry        Leukocytosis  Assessment & Plan  Resolved    MARCIAL (acute kidney injury) ([address] )  Assessment & Plan  No baseline creatinine  Improving  Continue IV fluids          VTE Pharmacologic Prophylaxis: VTE Score: 5 Heparin    Patient Centered Rounds: I performed bedside rounds with nursing staff today  Discussions with Specialists or Other Care Team Provider:  Yes, Cardiology    Education and Discussions with Family / Patient: Patient will update his family  Time Spent for Care: 15 minutes  More than 50% of total time spent on counseling and coordination of care as described above  Current Length of Stay: 0 day(s)  Current Patient Status: Observation   Certification Statement: The patient will continue to require additional inpatient hospital stay due to Need for further cardiac workup  Discharge Plan: Anticipate discharge in 24-48 hrs to home  Code Status: Level 1 - Full Code    Subjective:   Seen and examined at bedside  No new complaints  No further syncopal events    Objective:     Vitals:   Temp (24hrs), Av 6 °F (37 °C), Min:97 9 °F (36 6 °C), Max:98 9 °F (37 2 °C)    Temp:  [97 9 °F (36 6 °C)-98 9 °F (37 2 °C)] 97 9 °F (36 6 °C)  HR:  [57-75] 68  Resp:  [16-21] 16  BP: (111-154)/(77-92) 111/78  SpO2:  [92 %-97 %] 94 %  Body mass index is 44 09 kg/m²  Input and Output Summary (last 24 hours):      Intake/Output Summary (Last 24 hours) at 2022 1422  Last data filed at 2022 1300  Gross per 24 hour   Intake 3343 33 ml   Output 1225 ml   Net 2118 33 ml Physical Exam:   Physical Exam General- Awake, alert and oriented x 3, looks comfortable  HEENT- Normocephalic, atraumatic, oral mucosa- moist  Neck- Supple, No carotid bruit, no JVD  CVS- Normal S1/ S2, Regular rate and rhythm, No murmur, No edema  Respiratory system- B/L clear breath sounds, no wheezing  Abdomen- Soft, Non distended, no tenderness, Bowel sound- present 4 quads  Genitourinary- No suprapubic tenderness, No CVA tenderness  Skin- No new bruise or rash  Musculoskeletal- No gross deformity  Psych- No acute psychosis  CNS- CN II- XII grossly intact, No acute focal neurologic deficit noted      Additional Data:     Labs:  Results from last 7 days   Lab Units 04/17/22  0433 04/16/22  1200 04/16/22  1200   WBC Thousand/uL 8 48   < > 14 16*   HEMOGLOBIN g/dL 13 3   < > 13 8   HEMATOCRIT % 39 8   < > 42 1   PLATELETS Thousands/uL 196   < > 221   NEUTROS PCT %  --   --  86*   LYMPHS PCT %  --   --  6*   MONOS PCT %  --   --  7   EOS PCT %  --   --  1    < > = values in this interval not displayed  Results from last 7 days   Lab Units 04/17/22  0433 04/16/22  1201 04/16/22  1201   SODIUM mmol/L 140   < > 142   POTASSIUM mmol/L 4 0   < > 4 3   CHLORIDE mmol/L 109*   < > 109*   CO2 mmol/L 24   < > 26   BUN mg/dL 21   < > 27*   CREATININE mg/dL 1 34*   < > 1 68*   ANION GAP mmol/L 7   < > 7   CALCIUM mg/dL 8 5   < > 9 1   ALBUMIN g/dL  --   --  3 8   TOTAL BILIRUBIN mg/dL  --   --  0 42   ALK PHOS U/L  --   --  77   ALT U/L  --   --  41   AST U/L  --   --  32   GLUCOSE RANDOM mg/dL 106   < > 94    < > = values in this interval not displayed       Results from last 7 days   Lab Units 04/16/22  1201   INR  1 08         Results from last 7 days   Lab Units 04/17/22  0433   HEMOGLOBIN A1C % 5 4           Lines/Drains:  Invasive Devices  Report    Peripheral Intravenous Line            Peripheral IV 04/16/22 Left Antecubital 1 day                  Telemetry:  Telemetry Orders (From admission, onward) 24 Hour Telemetry Monitoring  Continuous x 24 Hours (Telem)           References:    Telemetry Guidelines   Question:  Reason for 24 Hour Telemetry  Answer:  Syncope suspected to be cardiac in origin                 Telemetry Reviewed: Normal Sinus Rhythm  Indication for Continued Telemetry Use: Syncope             Imaging: No pertinent imaging reviewed  Recent Cultures (last 7 days):         Last 24 Hours Medication List:   Current Facility-Administered Medications   Medication Dose Route Frequency Provider Last Rate    heparin (porcine)  5,000 Units Subcutaneous [facility] Bertram Rivera MD          Today, Patient Was Seen By: Ha Mcgraw MD    **Please Note: This note may have been constructed using a voice recognition system  **

## 2022-04-17 NOTE — CONSULTS
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 male who has no significant past medical who presented to the emergency room yesterday afternoon with complaints of a syncopal episode while at work the morning of admission  Patient stated that he developed an upset stomach went to the bathroom and had a large volume, loose bowel movement and after returning to work became nauseous with epigastric pain, diaphoretic and while getting up to go to the bathroom, lost consciousness  He denies CP, SOB or other recent syncope episodes  EMS was summoned and he was brought to the emergency room for further evaluation  He states he exercises regularly for 409 minutes of cardio and has no symptoms while exercising  In the emergency room his creatinine was noted to be elevated at 1 6, has since improved to 1 3 today  Troponin negative x3  EKG revealed normal sinus rhythm with ST elevations consistent with pericarditis early repolarization      REVIEW OF SYSTEMS:  Constitutional:  Denies fever or chills, +syncope   Eyes:  Denies change in visual acuity   HENT:  Denies nasal congestion or sore throat   Respiratory:  Denies cough, orthopnea, PND or shortness of breath   Cardiovascular:  Denies chest pain, palpitations or edema   GI:  + abdominal pain, nausea, vomiting, and diarrhea, denies bloody stools   :  Denies dysuria, frequency, difficulty in micturition and nocturia  Musculoskeletal:  Denies back pain or joint pain   Neurologic:  Denies headache, focal weakness or sensory changes   Endocrine:  Denies polyuria or polydipsia   Lymphatic:  Denies swollen glands   Psychiatric:  Denies depression or anxiety     Historical Information   Past Medical History:   Diagnosis Date    No pertinent past medical history      Past Surgical History:   Procedure Laterality Date    VASECTOMY N/A      Social History     Substance and Sexual Activity   Alcohol Use Yes    Comment: occasional     Social History     Substance and Sexual Activity   Drug Use Never     Social History     Tobacco Use   Smoking Status Never Smoker   Smokeless Tobacco Never Used       Family History: History reviewed  No pertinent family history  MEDS & ALLERGIES:  all current active meds have been reviewed and current meds:   Current Facility-Administered Medications   Medication Dose Route Frequency    heparin (porcine) subcutaneous injection 5,000 Units  5,000 Units Subcutaneous Q8H [facility]    sodium chloride 0 9 % infusion  100 mL/hr Intravenous Continuous     sodium chloride, 100 mL/hr, Last Rate: 100 mL/hr (04/17/22 0350)      No Known Allergies    OBJECTIVE:  Vitals:   Vitals:    04/17/22 0731   BP: 120/82   Pulse: 57   Resp: 21   Temp: 98 5 °F (36 9 °C)   SpO2: 96%     Body mass index is 44 09 kg/m²      Systolic (19JWS), YIE:938 , Min:111 , HXB:607     Diastolic (01YQC), BWI:19, Min:58, Max:92      Intake/Output Summary (Last 24 hours) at 4/17/2022 0806  Last data filed at 4/17/2022 0411  Gross per 24 hour   Intake 3103 33 ml   Output 1225 ml   Net 1878 33 ml     Weight (last 2 days)     Date/Time Weight    04/16/22 1130 132 (290)        Invasive Devices  Report    Peripheral Intravenous Line            Peripheral IV 04/16/22 Left Antecubital <1 day                PHYSICAL EXAMS:  General:  Patient is not in acute distress, laying in the bed comfortably, awake, alert   Head: Normocephalic, Atraumatic  HEENT: White sclera, pink conjunctiva  Neck:  Supple, no neck vein distention  Respiratory: clear to auscultation   Cardiovascular:  PMI normal, S1-S2 normal, no murmurs, thrills, gallops, rubs, regular rhythm  GI:  Abdomen soft, non-tender, non-distended  Extremities: No edema, normal pulses  Integument:  No skin rashes or ulceration  Lymphatic:  No cervical or inguinal lymphadenopathy  Neurologic:  Patient is awake alert, responding to command, oriented to person, place and time     LABORATORY RESULTS:      CBC with diff:   Results from last 7 days   Lab Units 04/17/22  0433 04/16/22  1200   WBC Thousand/uL 8 48 14 16*   HEMOGLOBIN g/dL 13 3 13 8   HEMATOCRIT % 39 8 42 1   MCV fL 91 91   PLATELETS Thousands/uL 196 221   MCH pg 30 2 29 8   MCHC g/dL 33 4 32 8   RDW % 12 8 12 7   MPV fL 9 5 9 6   NRBC AUTO /100 WBCs  --  0       CMP:  Results from last 7 days   Lab Units 04/17/22  0433 04/16/22  1201   POTASSIUM mmol/L 4 0 4 3   CHLORIDE mmol/L 109* 109*   CO2 mmol/L 24 26   BUN mg/dL 21 27*   CREATININE mg/dL 1 34* 1 68*   CALCIUM mg/dL 8 5 9 1   AST U/L  --  32   ALT U/L  --  41   ALK PHOS U/L  --  77   EGFR ml/min/1 73sq m 61 46       BMP:  Results from last 7 days   Lab Units 04/17/22  0433 04/16/22  1201   POTASSIUM mmol/L 4 0 4 3   CHLORIDE mmol/L 109* 109*   CO2 mmol/L 24 26   BUN mg/dL 21 27*   CREATININE mg/dL 1 34* 1 68*   CALCIUM mg/dL 8 5 9 1                        Results from last 7 days   Lab Units 04/16/22  1201   INR  1 08       Lipid Profile:   No results found for: CHOL  Lab Results   Component Value Date    HDL 66 04/17/2022     Lab Results   Component Value Date    LDLCALC 129 (H) 04/17/2022     Lab Results   Component Value Date    TRIG 87 04/17/2022       Cardiac testing:   No results found for this or any previous visit  No results found for this or any previous visit  No valid procedures specified    No results found for this or any previous visit         Imaging:   I have personally reviewed pertinent reports          EKG reviewed personally:  Normal sinus rhythm with ST elevations consistent with early repolarization or pericarditis    Telemetry reviewed personally:   Sinus rhythm in the 60's       Code Status: Level 1 - Full Code      NIYAH Bernal  4/17/2022,8:06 AM

## 2022-04-17 NOTE — PLAN OF CARE
Problem: PAIN - ADULT  Goal: Verbalizes/displays adequate comfort level or baseline comfort level  Description: Interventions:  - Encourage patient to monitor pain and request assistance  - Assess pain using appropriate pain scale  - Administer analgesics based on type and severity of pain and evaluate response  - Implement non-pharmacological measures as appropriate and evaluate response  - Consider cultural and social influences on pain and pain management  - Notify physician/advanced practitioner if interventions unsuccessful or patient reports new pain  Outcome: Progressing     Problem: INFECTION - ADULT  Goal: Absence or prevention of progression during hospitalization  Description: INTERVENTIONS:  - Assess and monitor for signs and symptoms of infection  - Monitor lab/diagnostic results  - Monitor all insertion sites, i e  indwelling lines, tubes, and drains  - Monitor endotracheal if appropriate and nasal secretions for changes in amount and color  - [place] appropriate cooling/warming therapies per order  - Administer medications as ordered  - Instruct and encourage patient and family to use good hand hygiene technique  - Identify and instruct in appropriate isolation precautions for identified infection/condition  Outcome: Progressing  Goal: Absence of fever/infection during neutropenic period  Description: INTERVENTIONS:  - Monitor WBC    Outcome: Progressing     Problem: SAFETY ADULT  Goal: Patient will remain free of falls  Description: INTERVENTIONS:  - Educate patient/family on patient safety including physical limitations  - Instruct patient to call for assistance with activity   - Consult OT/PT to assist with strengthening/mobility   - Keep Call bell within reach  - Keep bed low and locked with side rails adjusted as appropriate  - Keep care items and personal belongings within reach  - Initiate and maintain comfort rounds  - Make Fall Risk Sign visible to staff  - Offer Toileting every 2 Hours, in advance of need  - Initiate/Maintain bed alarm  - Obtain necessary fall risk management equipment: chair alarmr  - Apply yellow socks and bracelet for high fall risk patients  - Consider moving patient to room near nurses station  Outcome: Progressing  Goal: Maintain or return to baseline ADL function  Description: INTERVENTIONS:  -  Assess patient's ability to carry out ADLs; assess patient's baseline for ADL function and identify physical deficits which impact ability to perform ADLs (bathing, care of mouth/teeth, toileting, grooming, dressing, etc )  - Assess/evaluate cause of self-care deficits   - Assess range of motion  - Assess patient's mobility; develop plan if impaired  - Assess patient's need for assistive devices and provide as appropriate  - Encourage maximum independence but intervene and supervise when necessary  - Involve family in performance of ADLs  - Assess for home care needs following discharge   - Consider OT consult to assist with ADL evaluation and planning for discharge  - Provide patient education as appropriate  Outcome: Progressing  Goal: Maintains/Returns to pre admission functional level  Description: INTERVENTIONS:  - Perform BMAT or MOVE assessment daily    - Set and communicate daily mobility goal to care team and patient/family/caregiver  - Collaborate with rehabilitation services on mobility goals if consulted  - Perform Range of Motion 3 times a day  - Reposition patient every 3 hours    - Dangle patient 3 times a day  - Stand patient 3 times a day  - Ambulate patient 3 times a day  - Out of bed to chair 3 times a day   - Out of bed for meals 3 times a day  - Out of bed for toileting  - Record patient progress and toleration of activity level   Outcome: Progressing     Problem: DISCHARGE PLANNING  Goal: Discharge to home or other facility with appropriate resources  Description: INTERVENTIONS:  - Identify barriers to discharge w/patient and caregiver  - Arrange for needed discharge resources and transportation as appropriate  - Identify discharge learning needs (meds, wound care, etc )  - Arrange for interpretive services to assist at discharge as needed  - Refer to Case Management Department for coordinating discharge planning if the patient needs post-hospital services based on physician/advanced practitioner order or complex needs related to functional status, cognitive ability, or social support system  Outcome: Progressing     Problem: Knowledge Deficit  Goal: Patient/family/caregiver demonstrates understanding of disease process, treatment plan, medications, and discharge instructions  Description: Complete learning assessment and assess knowledge base    Interventions:  - Provide teaching at level of understanding  - Provide teaching via preferred learning methods  Outcome: Progressing     Problem: CARDIOVASCULAR - ADULT  Goal: Maintains optimal cardiac output and hemodynamic stability  Description: INTERVENTIONS:  - Monitor I/O, vital signs and rhythm  - Monitor for S/S and trends of decreased cardiac output  - Administer and titrate ordered vasoactive medications to optimize hemodynamic stability  - Assess quality of pulses, skin color and temperature  - Assess for signs of decreased coronary artery perfusion  - Instruct patient to report change in severity of symptoms  Outcome: Progressing  Goal: Absence of cardiac dysrhythmias or at baseline rhythm  Description: INTERVENTIONS:  - Continuous cardiac monitoring, vital signs, obtain 12 lead EKG if ordered  - Administer antiarrhythmic and heart rate control medications as ordered  - Monitor electrolytes and administer replacement therapy as ordered  Outcome: Progressing     Problem: GASTROINTESTINAL - ADULT  Goal: Minimal or absence of nausea and/or vomiting  Description: INTERVENTIONS:  - Administer IV fluids if ordered to ensure adequate hydration  - Maintain NPO status until nausea and vomiting are resolved  - Nasogastric tube if ordered  - Administer ordered antiemetic medications as needed  - Provide nonpharmacologic comfort measures as appropriate  - Advance diet as tolerated, if ordered  - Consider nutrition services referral to assist patient with adequate nutrition and appropriate food choices  Outcome: Progressing  Goal: Maintains or returns to baseline bowel function  Description: INTERVENTIONS:  - Assess bowel function  - Encourage oral fluids to ensure adequate hydration  - Administer IV fluids if ordered to ensure adequate hydration  - Administer ordered medications as needed  - Encourage mobilization and activity  - Consider nutritional services referral to assist patient with adequate nutrition and appropriate food choices  Outcome: Progressing     Problem: Potential for Falls  Goal: Patient will remain free of falls  Description: INTERVENTIONS:  - Educate patient/family on patient safety including physical limitations  - Instruct patient to call for assistance with activity   - Consult OT/PT to assist with strengthening/mobility   - Keep Call bell within reach  - Keep bed low and locked with side rails adjusted as appropriate  - Keep care items and personal belongings within reach  - Initiate and maintain comfort rounds  - Make Fall Risk Sign visible to staff  - Offer Toileting every 2 Hours, in advance of need  - Initiate/Maintain bed alarm  - Obtain necessary fall risk management equipment: chair alarm  - Apply yellow socks and bracelet for high fall risk patients  - Consider moving patient to room near nurses station  Outcome: Progressing

## 2022-04-18 ENCOUNTER — APPOINTMENT (OUTPATIENT)
Dept: NON INVASIVE DIAGNOSTICS | Facility: HOSPITAL | Age: 52
End: 2022-04-18
Payer: COMMERCIAL

## 2022-04-18 ENCOUNTER — APPOINTMENT (OUTPATIENT)
Dept: NUCLEAR MEDICINE | Facility: HOSPITAL | Age: 52
End: 2022-04-18
Payer: COMMERCIAL

## 2022-04-18 VITALS
TEMPERATURE: 97.6 F | SYSTOLIC BLOOD PRESSURE: 124 MMHG | HEIGHT: 68 IN | OXYGEN SATURATION: 97 % | BODY MASS INDEX: 43.95 KG/M2 | WEIGHT: 290 LBS | HEART RATE: 70 BPM | DIASTOLIC BLOOD PRESSURE: 76 MMHG | RESPIRATION RATE: 18 BRPM

## 2022-04-18 LAB
ANION GAP SERPL CALCULATED.3IONS-SCNC: 5 MMOL/L (ref 4–13)
AORTIC ROOT: 3.3 CM
APICAL FOUR CHAMBER EJECTION FRACTION: 63 %
ASCENDING AORTA: 3.4 CM (ref 2.3–3.45)
BUN SERPL-MCNC: 17 MG/DL (ref 5–25)
CALCIUM SERPL-MCNC: 8.4 MG/DL (ref 8.3–10.1)
CHLORIDE SERPL-SCNC: 109 MMOL/L (ref 100–108)
CO2 SERPL-SCNC: 27 MMOL/L (ref 21–32)
CREAT SERPL-MCNC: 1.36 MG/DL (ref 0.6–1.3)
E WAVE DECELERATION TIME: 206 MS
FRACTIONAL SHORTENING: 36 % (ref 28–44)
GFR SERPL CREATININE-BSD FRML MDRD: 60 ML/MIN/1.73SQ M
GLUCOSE P FAST SERPL-MCNC: 101 MG/DL (ref 65–99)
GLUCOSE SERPL-MCNC: 101 MG/DL (ref 65–140)
INTERVENTRICULAR SEPTUM IN DIASTOLE (PARASTERNAL SHORT AXIS VIEW): 1 CM
INTERVENTRICULAR SEPTUM: 1 CM (ref 0.59–1.11)
LAAS-AP2: 14 CM2
LAAS-AP4: 18.3 CM2
LEFT ATRIUM AREA SYSTOLE SINGLE PLANE A4C: 16.5 CM2
LEFT ATRIUM SIZE: 3.6 CM
LEFT INTERNAL DIMENSION IN SYSTOLE: 3.2 CM (ref 8.31–12.6)
LEFT VENTRICULAR INTERNAL DIMENSION IN DIASTOLE: 5 CM (ref 14.25–21.24)
LEFT VENTRICULAR POSTERIOR WALL IN END DIASTOLE: 0.9 CM (ref 0.57–1.09)
LEFT VENTRICULAR STROKE VOLUME: 77 ML
LVSV (TEICH): 77 ML
MAX HR PERCENT: 88 %
MV E'TISSUE VEL-SEP: 10 CM/S
MV PEAK A VEL: 0.72 M/S
MV PEAK E VEL: 82 CM/S
MV STENOSIS PRESSURE HALF TIME: 60 MS
MV VALVE AREA P 1/2 METHOD: 3.67 CM2
NUC STRESS EJECTION FRACTION: 47 %
POTASSIUM SERPL-SCNC: 3.8 MMOL/L (ref 3.5–5.3)
RATE PRESSURE PRODUCT: NORMAL
RIGHT ATRIUM AREA SYSTOLE A4C: 15.2 CM2
RIGHT VENTRICLE ID DIMENSION: 2.8 CM
SL CV LEFT ATRIUM LENGTH A2C: 4.9 CM
SL CV PED ECHO LEFT VENTRICLE DIASTOLIC VOLUME (MOD BIPLANE) 2D: 118 ML
SL CV PED ECHO LEFT VENTRICLE SYSTOLIC VOLUME (MOD BIPLANE) 2D: 41 ML
SL CV REST NUCLEAR ISOTOPE DOSE: 15 MCI
SL CV STRESS NUCLEAR ISOTOPE DOSE: 45.8 MCI
SL CV STRESS RECOVERY BP: NORMAL MMHG
SL CV STRESS RECOVERY HR: 94 BPM
SL CV STRESS STAGE REACHED: 3
SODIUM SERPL-SCNC: 141 MMOL/L (ref 136–145)
STRESS BASELINE BP: NORMAL MMHG
STRESS BASELINE HR: 71 BPM
STRESS O2 SAT REST: 98 %
STRESS PEAK HR: 150 BPM
STRESS PERCENT HR: 88 %
STRESS POST ESTIMATED WORKLOAD: 10.1 METS
STRESS POST EXERCISE DUR MIN: 8 MIN
STRESS POST EXERCISE DUR SEC: 30 SEC
STRESS POST O2 SAT PEAK: 93 %
STRESS POST PEAK BP: 178 MMHG
STRESS TARGET HR: 150 BPM
TR MAX PG: 16 MMHG
TR PEAK VELOCITY: 2 M/S
TRICUSPID VALVE PEAK REGURGITATION VELOCITY: 2.02 M/S
Z-SCORE OF ASCENDING AORTA: 1.82
Z-SCORE OF INTERVENTRICULAR SEPTUM IN END DIASTOLE: 1.16
Z-SCORE OF LEFT VENTRICULAR DIMENSION IN END DIASTOLE: -12.47
Z-SCORE OF LEFT VENTRICULAR DIMENSION IN END SYSTOLE: -9.19
Z-SCORE OF LEFT VENTRICULAR POSTERIOR WALL IN END DIASTOLE: 0.51

## 2022-04-18 PROCEDURE — G1004 CDSM NDSC: HCPCS

## 2022-04-18 PROCEDURE — 93018 CV STRESS TEST I&R ONLY: CPT | Performed by: INTERNAL MEDICINE

## 2022-04-18 PROCEDURE — 99214 OFFICE O/P EST MOD 30 MIN: CPT | Performed by: INTERNAL MEDICINE

## 2022-04-18 PROCEDURE — 99217 PR OBSERVATION CARE DISCHARGE MANAGEMENT: CPT | Performed by: HOSPITALIST

## 2022-04-18 PROCEDURE — 93306 TTE W/DOPPLER COMPLETE: CPT | Performed by: INTERNAL MEDICINE

## 2022-04-18 PROCEDURE — 80048 BASIC METABOLIC PNL TOTAL CA: CPT | Performed by: FAMILY MEDICINE

## 2022-04-18 PROCEDURE — 78452 HT MUSCLE IMAGE SPECT MULT: CPT | Performed by: INTERNAL MEDICINE

## 2022-04-18 PROCEDURE — 93306 TTE W/DOPPLER COMPLETE: CPT

## 2022-04-18 PROCEDURE — 93017 CV STRESS TEST TRACING ONLY: CPT

## 2022-04-18 PROCEDURE — 78452 HT MUSCLE IMAGE SPECT MULT: CPT

## 2022-04-18 PROCEDURE — A9502 TC99M TETROFOSMIN: HCPCS

## 2022-04-18 PROCEDURE — 93016 CV STRESS TEST SUPVJ ONLY: CPT | Performed by: INTERNAL MEDICINE

## 2022-04-18 NOTE — PROGRESS NOTES
Cardiology Progress Note Monte Kanner Diggs 49 y o  male MRN: [id number]    Unit/Bed#: -01 Encounter: [id number]      Assessment/Plan:  1  Syncope, unclear etiology, likely vasovagal in the setting of nausea and diarrhea  Orthostatic vital signs negative  Echocardiogram done EF 60%, trace TR/IN  Stress test negative for ischemia  Plan for outpatient event monitor  2  Acute kidney injury creatinine today 1 36  Continue to monitor  Pt is stable from cardiac standpoint for DC  Will follow-up in office  Subjective:   Patient seen and examined  No significant events overnight  Im feeling ok  Denies chest pain    Objective:     Vitals: Blood pressure 124/76, pulse 70, temperature 97 6 °F (36 4 °C), resp  rate 18, height 5' 8" (1 727 m), weight 132 kg (290 lb), SpO2 97 %  , Body mass index is 44 09 kg/m² ,   Orthostatic Blood Pressures      Most Recent Value   Blood Pressure 124/76 filed at 04/18/2022 1530   Patient Position - Orthostatic VS Lying filed at 04/18/2022 0252            Intake/Output Summary (Last 24 hours) at 4/18/2022 1541  Last data filed at 4/18/2022 0900  Gross per 24 hour   Intake 720 ml   Output 550 ml   Net 170 ml         Physical Exam:    GEN: Albino Rudd appears well, alert and oriented x 3, pleasant and cooperative   HEENT: pupils equal, round, and reactive to light; extraocular muscles intact  NECK: supple, no carotid bruits   HEART: regular rhythm, normal S1 and S2, no murmurs, clicks, gallops or rubs   LUNGS: clear to auscultation bilaterally; no wheezes, rales, or rhonchi   ABDOMEN: normal bowel sounds, soft, no tenderness, no distention  EXTREMITIES: peripheral pulses normal; no clubbing, cyanosis, or edema      Medications:      Current Facility-Administered Medications:     heparin (porcine) subcutaneous injection 5,000 Units, 5,000 Units, Subcutaneous, Q8H [address], 5,000 Units at 04/17/22 2206 **AND** [CANCELED] Platelet count, , , Once, Hannah Tipton MD     Labs & Results: Results from last 7 days   Lab Units 04/17/22  0433 04/16/22  1200   WBC Thousand/uL 8 48 14 16*   HEMOGLOBIN g/dL 13 3 13 8   HEMATOCRIT % 39 8 42 1   PLATELETS Thousands/uL 196 221     Results from last 7 days   Lab Units 04/17/22  0433   TRIGLYCERIDES mg/dL 87   HDL mg/dL 66     Results from last 7 days   Lab Units 04/18/22  0453 04/17/22  0433 04/16/22  1201   POTASSIUM mmol/L 3 8 4 0 4 3   CHLORIDE mmol/L 109* 109* 109*   CO2 mmol/L 27 24 26   BUN mg/dL 17 21 27*   CREATININE mg/dL 1 36* 1 34* 1 68*   CALCIUM mg/dL 8 4 8 5 9 1   ALK PHOS U/L  --   --  77   ALT U/L  --   --  41   AST U/L  --   --  32     Results from last 7 days   Lab Units 04/16/22  1201   INR  1 08   PTT seconds 25

## 2022-04-18 NOTE — PLAN OF CARE
Problem: PAIN - ADULT  Goal: Verbalizes/displays adequate comfort level or baseline comfort level  Description: Interventions:  - Encourage patient to monitor pain and request assistance  - Assess pain using appropriate pain scale  - Administer analgesics based on type and severity of pain and evaluate response  - Implement non-pharmacological measures as appropriate and evaluate response  - Consider cultural and social influences on pain and pain management  - Notify physician/advanced practitioner if interventions unsuccessful or patient reports new pain  Outcome: Progressing     Problem: INFECTION - ADULT  Goal: Absence or prevention of progression during hospitalization  Description: INTERVENTIONS:  - Assess and monitor for signs and symptoms of infection  - Monitor lab/diagnostic results  - Monitor all insertion sites, i e  indwelling lines, tubes, and drains  - Monitor endotracheal if appropriate and nasal secretions for changes in amount and color  - [place] appropriate cooling/warming therapies per order  - Administer medications as ordered  - Instruct and encourage patient and family to use good hand hygiene technique  - Identify and instruct in appropriate isolation precautions for identified infection/condition  Outcome: Progressing  Goal: Absence of fever/infection during neutropenic period  Description: INTERVENTIONS:  - Monitor WBC    Outcome: Progressing     Problem: SAFETY ADULT  Goal: Patient will remain free of falls  Description: INTERVENTIONS:  - Educate patient/family on patient safety including physical limitations  - Instruct patient to call for assistance with activity   - Consult OT/PT to assist with strengthening/mobility   - Keep Call bell within reach  - Keep bed low and locked with side rails adjusted as appropriate  - Keep care items and personal belongings within reach  - Initiate and maintain comfort rounds  - Make Fall Risk Sign visible to staff  - Offer Toileting every 2 Hours, in advance of need  - Initiate/Maintain bed alarm  - Obtain necessary fall risk management equipment  - Apply yellow socks and bracelet for high fall risk patients  - Consider moving patient to room near nurses station  Outcome: Progressing  Goal: Maintain or return to baseline ADL function  Description: INTERVENTIONS:  -  Assess patient's ability to carry out ADLs; assess patient's baseline for ADL function and identify physical deficits which impact ability to perform ADLs (bathing, care of mouth/teeth, toileting, grooming, dressing, etc )  - Assess/evaluate cause of self-care deficits   - Assess range of motion  - Assess patient's mobility; develop plan if impaired  - Assess patient's need for assistive devices and provide as appropriate  - Encourage maximum independence but intervene and supervise when necessary  - Involve family in performance of ADLs  - Assess for home care needs following discharge   - Consider OT consult to assist with ADL evaluation and planning for discharge  - Provide patient education as appropriate  Outcome: Progressing  Goal: Maintains/Returns to pre admission functional level  Description: INTERVENTIONS:  - Perform BMAT or MOVE assessment daily    - Set and communicate daily mobility goal to care team and patient/family/caregiver  - Collaborate with rehabilitation services on mobility goals if consulted  - Perform Range of Motion 4 times a day  - Reposition patient every 2 hours    - Dangle patient 4 times a day  - Stand patient 4 times a day  - Ambulate patient 4 times a day  - Out of bed to chair 4 times a day   - Out of bed for meals 4 times a day  - Out of bed for toileting  - Record patient progress and toleration of activity level   Outcome: Progressing     Problem: DISCHARGE PLANNING  Goal: Discharge to home or other facility with appropriate resources  Description: INTERVENTIONS:  - Identify barriers to discharge w/patient and caregiver  - Arrange for needed discharge resources and transportation as appropriate  - Identify discharge learning needs (meds, wound care, etc )  - Arrange for interpretive services to assist at discharge as needed  - Refer to Case Management Department for coordinating discharge planning if the patient needs post-hospital services based on physician/advanced practitioner order or complex needs related to functional status, cognitive ability, or social support system  Outcome: Progressing     Problem: Knowledge Deficit  Goal: Patient/family/caregiver demonstrates understanding of disease process, treatment plan, medications, and discharge instructions  Description: Complete learning assessment and assess knowledge base    Interventions:  - Provide teaching at level of understanding  - Provide teaching via preferred learning methods  Outcome: Progressing     Problem: CARDIOVASCULAR - ADULT  Goal: Maintains optimal cardiac output and hemodynamic stability  Description: INTERVENTIONS:  - Monitor I/O, vital signs and rhythm  - Monitor for S/S and trends of decreased cardiac output  - Administer and titrate ordered vasoactive medications to optimize hemodynamic stability  - Assess quality of pulses, skin color and temperature  - Assess for signs of decreased coronary artery perfusion  - Instruct patient to report change in severity of symptoms  Outcome: Progressing  Goal: Absence of cardiac dysrhythmias or at baseline rhythm  Description: INTERVENTIONS:  - Continuous cardiac monitoring, vital signs, obtain 12 lead EKG if ordered  - Administer antiarrhythmic and heart rate control medications as ordered  - Monitor electrolytes and administer replacement therapy as ordered  Outcome: Progressing     Problem: GASTROINTESTINAL - ADULT  Goal: Minimal or absence of nausea and/or vomiting  Description: INTERVENTIONS:  - Administer IV fluids if ordered to ensure adequate hydration  - Maintain NPO status until nausea and vomiting are resolved  - Nasogastric tube if ordered  - Administer ordered antiemetic medications as needed  - Provide nonpharmacologic comfort measures as appropriate  - Advance diet as tolerated, if ordered  - Consider nutrition services referral to assist patient with adequate nutrition and appropriate food choices  Outcome: Progressing  Goal: Maintains or returns to baseline bowel function  Description: INTERVENTIONS:  - Assess bowel function  - Encourage oral fluids to ensure adequate hydration  - Administer IV fluids if ordered to ensure adequate hydration  - Administer ordered medications as needed  - Encourage mobilization and activity  - Consider nutritional services referral to assist patient with adequate nutrition and appropriate food choices  Outcome: Progressing     Problem: Potential for Falls  Goal: Patient will remain free of falls  Description: INTERVENTIONS:  - Educate patient/family on patient safety including physical limitations  - Instruct patient to call for assistance with activity   - Consult OT/PT to assist with strengthening/mobility   - Keep Call bell within reach  - Keep bed low and locked with side rails adjusted as appropriate  - Keep care items and personal belongings within reach  - Initiate and maintain comfort rounds  - Make Fall Risk Sign visible to staff  - Offer Toileting every 2 Hours, in advance of need  - Initiate/Maintain bed alarm  - Obtain necessary fall risk management equipment  - Apply yellow socks and bracelet for high fall risk patients  - Consider moving patient to room near nurses station  Outcome: Progressing

## 2022-04-18 NOTE — PLAN OF CARE
Problem: PAIN - ADULT  Goal: Verbalizes/displays adequate comfort level or baseline comfort level  Description: Interventions:  - Encourage patient to monitor pain and request assistance  - Assess pain using appropriate pain scale  - Administer analgesics based on type and severity of pain and evaluate response  - Implement non-pharmacological measures as appropriate and evaluate response  - Consider cultural and social influences on pain and pain management  - Notify physician/advanced practitioner if interventions unsuccessful or patient reports new pain  Outcome: Progressing     Problem: INFECTION - ADULT  Goal: Absence or prevention of progression during hospitalization  Description: INTERVENTIONS:  - Assess and monitor for signs and symptoms of infection  - Monitor lab/diagnostic results  - Monitor all insertion sites, i e  indwelling lines, tubes, and drains  - Monitor endotracheal if appropriate and nasal secretions for changes in amount and color  - [place] appropriate cooling/warming therapies per order  - Administer medications as ordered  - Instruct and encourage patient and family to use good hand hygiene technique  - Identify and instruct in appropriate isolation precautions for identified infection/condition  Outcome: Progressing  Goal: Absence of fever/infection during neutropenic period  Description: INTERVENTIONS:  - Monitor WBC    Outcome: Progressing     Problem: SAFETY ADULT  Goal: Patient will remain free of falls  Description: INTERVENTIONS:  - Educate patient/family on patient safety including physical limitations  - Instruct patient to call for assistance with activity   - Consult OT/PT to assist with strengthening/mobility   - Keep Call bell within reach  - Keep bed low and locked with side rails adjusted as appropriate  - Keep care items and personal belongings within reach  - Initiate and maintain comfort rounds  - Make Fall Risk Sign visible to staff  - Offer Toileting every 2 Hours, in advance of need  - Initiate/Maintain 2alarm  - Obtain necessary fall risk management equipment: 2  - Apply yellow socks and bracelet for high fall risk patients  - Consider moving patient to room near nurses station  Outcome: Progressing  Goal: Maintain or return to baseline ADL function  Description: INTERVENTIONS:  -  Assess patient's ability to carry out ADLs; assess patient's baseline for ADL function and identify physical deficits which impact ability to perform ADLs (bathing, care of mouth/teeth, toileting, grooming, dressing, etc )  - Assess/evaluate cause of self-care deficits   - Assess range of motion  - Assess patient's mobility; develop plan if impaired  - Assess patient's need for assistive devices and provide as appropriate  - Encourage maximum independence but intervene and supervise when necessary  - Involve family in performance of ADLs  - Assess for home care needs following discharge   - Consider OT consult to assist with ADL evaluation and planning for discharge  - Provide patient education as appropriate  Outcome: Progressing  Goal: Maintains/Returns to pre admission functional level  Description: INTERVENTIONS:  - Perform BMAT or MOVE assessment daily    - Set and communicate daily mobility goal to care team and patient/family/caregiver  - Collaborate with rehabilitation services on mobility goals if consulted  - Perform Range of Motion 2 times a day  - Reposition patient every 2 hours    - Dangle patient 2 times a day  - Stand patient 2 times a day  - Ambulate patient 2 times a day  - Out of bed to chair 2 times a day   - Out of bed for meals 2 times a day  - Out of bed for toileting  - Record patient progress and toleration of activity level   Outcome: Progressing     Problem: DISCHARGE PLANNING  Goal: Discharge to home or other facility with appropriate resources  Description: INTERVENTIONS:  - Identify barriers to discharge w/patient and caregiver  - Arrange for needed discharge resources and transportation as appropriate  - Identify discharge learning needs (meds, wound care, etc )  - Arrange for interpretive services to assist at discharge as needed  - Refer to Case Management Department for coordinating discharge planning if the patient needs post-hospital services based on physician/advanced practitioner order or complex needs related to functional status, cognitive ability, or social support system  Outcome: Progressing     Problem: Knowledge Deficit  Goal: Patient/family/caregiver demonstrates understanding of disease process, treatment plan, medications, and discharge instructions  Description: Complete learning assessment and assess knowledge base    Interventions:  - Provide teaching at level of understanding  - Provide teaching via preferred learning methods  Outcome: Progressing     Problem: CARDIOVASCULAR - ADULT  Goal: Maintains optimal cardiac output and hemodynamic stability  Description: INTERVENTIONS:  - Monitor I/O, vital signs and rhythm  - Monitor for S/S and trends of decreased cardiac output  - Administer and titrate ordered vasoactive medications to optimize hemodynamic stability  - Assess quality of pulses, skin color and temperature  - Assess for signs of decreased coronary artery perfusion  - Instruct patient to report change in severity of symptoms  Outcome: Progressing  Goal: Absence of cardiac dysrhythmias or at baseline rhythm  Description: INTERVENTIONS:  - Continuous cardiac monitoring, vital signs, obtain 12 lead EKG if ordered  - Administer antiarrhythmic and heart rate control medications as ordered  - Monitor electrolytes and administer replacement therapy as ordered  Outcome: Progressing     Problem: GASTROINTESTINAL - ADULT  Goal: Minimal or absence of nausea and/or vomiting  Description: INTERVENTIONS:  - Administer IV fluids if ordered to ensure adequate hydration  - Maintain NPO status until nausea and vomiting are resolved  - Nasogastric tube if ordered  - Administer ordered antiemetic medications as needed  - Provide nonpharmacologic comfort measures as appropriate  - Advance diet as tolerated, if ordered  - Consider nutrition services referral to assist patient with adequate nutrition and appropriate food choices  Outcome: Progressing  Goal: Maintains or returns to baseline bowel function  Description: INTERVENTIONS:  - Assess bowel function  - Encourage oral fluids to ensure adequate hydration  - Administer IV fluids if ordered to ensure adequate hydration  - Administer ordered medications as needed  - Encourage mobilization and activity  - Consider nutritional services referral to assist patient with adequate nutrition and appropriate food choices  Outcome: Progressing     Problem: Potential for Falls  Goal: Patient will remain free of falls  Description: INTERVENTIONS:  - Educate patient/family on patient safety including physical limitations  - Instruct patient to call for assistance with activity   - Consult OT/PT to assist with strengthening/mobility   - Keep Call bell within reach  - Keep bed low and locked with side rails adjusted as appropriate  - Keep care items and personal belongings within reach  - Initiate and maintain comfort rounds  - Make Fall Risk Sign visible to staff  - Offer Toileting every 2 Hours, in advance of need  - Initiate/Maintain 2alarm  - Obtain necessary fall risk management equipment: 2  - Apply yellow socks and bracelet for high fall risk patients  - Consider moving patient to room near nurses station  Outcome: Progressing

## 2022-04-18 NOTE — PROGRESS NOTES
[facility]  Progress Note - Ismael Rush 1970, 49 y o  male MRN: [id number]  Unit/Bed#: -01 Encounter: [id number]  Primary Care Provider: No primary care provider on file  Date and time admitted to hospital: 4/16/2022 11:14 AM    Leukocytosis  Assessment & Plan  Resolved    MARCIAL (acute kidney injury) ([address] )  Assessment & Plan  No baseline creatinine  Improved 1 6->1 38, given his weight may be his baseline cr    * Syncope  Assessment & Plan  · Orthostatics normal  · Telemetry shows no arrhythmias  · Stress test negative - no reproducible ischemia  · Echo reviewed- no valvular abnormalities, EF preserved  · Continue telemetry -no arrhythmias seen             Hospital Course:     Ismael Rush is a 49 y o  male patient who originally presented to the hospital on   Admission Orders (From admission, onward)     Ordered        04/16/22 1258  Place in Observation  Once                     due to syncope  Patient was admitted to telemetry for observation  He had no arrhythmias noted  Cardiology was consulted patient had echocardiogram performed that was unremarkable patient felt better after observation in the hospital   He was also set up for stress test that was performed- no reproducible signs of ischemia are noted  His syncope was likely vagal or related to dehydration as he had slightly elevated creatinine on presentation  He is advised to follow up with his primary care provider and cardiology for Holter monitoring if desired  Please see above list of diagnoses and related plan for additional information       Physical Exam:    GEN: No acute distress, comfortable, obese male  HEEENT: No JVD, PERRLA, no scleral icterus  RESP: Lungs clear to auscultation bilaterally  CV: RRR, +s1/s2   ABD: SOFT NON TENDER, POSITIVE BOWEL SOUNDS, NO DISTENTION  PSYCH: CALM  NEURO: A X O X 3, NO FOCAL DEFICITS  SKIN: NO RASH  EXTREM: NO EDEMA      Condition at Discharge:  good      Discharge instructions/Information to patient and family:   See after visit summary for information provided to patient and family  Provisions for Follow-Up Care:  See after visit summary for information related to follow-up care and any pertinent home health orders  Disposition:     Home       Discharge Statement:  I spent 32 minutes discharging the patient  This time was spent on the day of discharge  I had direct contact with the patient on the day of discharge  Greater than 50% of the total time was spent examining patient, answering all patient questions, arranging and discussing plan of care with patient as well as directly providing post-discharge instructions  Additional time then spent on discharge activities  Discharge Medications:  See after visit summary for reconciled discharge medications provided to patient and family        ** Please Note: This note has been constructed using a voice recognition system **

## 2022-04-18 NOTE — DISCHARGE SUMMARY
[facility]  Progress Note - Abby Zepeda 1970, 49 y o  male MRN: [id number]  Unit/Bed#: -01 Encounter: [id number]  Primary Care Provider: No primary care provider on file  Date and time admitted to hospital: 4/16/2022 11:14 AM     Leukocytosis  Assessment & Plan  Resolved     MARCIAL (acute kidney injury) ([address] )  Assessment & Plan  No baseline creatinine  Improved 1 6->1 38, given his weight may be his baseline cr     * Syncope  Assessment & Plan  · Orthostatics normal  · Telemetry shows no arrhythmias  · Stress test negative - no reproducible ischemia  · Echo reviewed- no valvular abnormalities, EF preserved  · Continue telemetry -no arrhythmias seen                 Hospital Course:      Abby Zepeda is a 49 y o  male patient who originally presented to the hospital on       Admission Orders (From admission, onward)              Ordered          04/16/22 1258   Place in Observation  Once                        due to syncope  Patient was admitted to telemetry for observation  He had no arrhythmias noted  Cardiology was consulted patient had echocardiogram performed that was unremarkable patient felt better after observation in the hospital   He was also set up for stress test that was performed- no reproducible signs of ischemia are noted  His syncope was likely vagal or related to dehydration as he had slightly elevated creatinine on presentation    He is advised to follow up with his primary care provider and cardiology for Holter monitoring if desired             Please see above list of diagnoses and related plan for additional information       Physical Exam:     GEN: No acute distress, comfortable, obese male  HEEENT: No JVD, PERRLA, no scleral icterus  RESP: Lungs clear to auscultation bilaterally  CV: RRR, +s1/s2   ABD: SOFT NON TENDER, POSITIVE BOWEL SOUNDS, NO DISTENTION  PSYCH: CALM  NEURO: A X O X 3, NO FOCAL DEFICITS  SKIN: NO RASH  EXTREM: NO EDEMA        Condition at Discharge:  good        Discharge instructions/Information to patient and family:   See after visit summary for information provided to patient and family        Provisions for Follow-Up Care:  See after visit summary for information related to follow-up care and any pertinent home health orders        Disposition:      Home        Discharge Statement:  I spent 32 minutes discharging the patient  This time was spent on the day of discharge  I had direct contact with the patient on the day of discharge  Greater than 50% of the total time was spent examining patient, answering all patient questions, arranging and discussing plan of care with patient as well as directly providing post-discharge instructions    Additional time then spent on discharge activities      Discharge Medications:  See after visit summary for reconciled discharge medications provided to patient and family        ** Please Note: This note has been constructed using a voice recognition system **

## 2022-04-18 NOTE — UTILIZATION REVIEW
Continued Stay Review    Date: 4/18                          Current Patient Class: OBS  Current Level of Care: Tele     HPI:51 y o  male initially admitted on  4/16 for syncope and MARCIAL     Assessment/Plan: plan for stress test today 4/18    Vital Signs:   04/18/22 06:56:33 97 9 °F (36 6 °C) 58 18 140/85 103 95 % -- --   04/18/22 0500 -- 70 -- -- -- -- -- --   04/18/22 0300 -- 64 -- 115/68 84 96 % -- --   04/18/22 02:52:14 98 7 °F (37 1 °C) 65 18 115/68 84 95 % -- Lying   04/18/22 0100 -- -- -- -- -- 96 % --          Pertinent Labs/Diagnostic Results:       Results from last 7 days   Lab Units 04/17/22  0433 04/16/22  1200   WBC Thousand/uL 8 48 14 16*   HEMOGLOBIN g/dL 13 3 13 8   HEMATOCRIT % 39 8 42 1   PLATELETS Thousands/uL 196 221   NEUTROS ABS Thousands/µL  --  12 09*     Results from last 7 days   Lab Units 04/18/22  0453 04/17/22  0433 04/16/22  1201   SODIUM mmol/L 141 140 142   POTASSIUM mmol/L 3 8 4 0 4 3   CHLORIDE mmol/L 109* 109* 109*   CO2 mmol/L 27 24 26   ANION GAP mmol/L 5 7 7   BUN mg/dL 17 21 27*   CREATININE mg/dL 1 36* 1 34* 1 68*   EGFR ml/min/1 73sq m 60 61 46   CALCIUM mg/dL 8 4 8 5 9 1     Results from last 7 days   Lab Units 04/16/22  1201   AST U/L 32   ALT U/L 41   ALK PHOS U/L 77   TOTAL PROTEIN g/dL 7 5   ALBUMIN g/dL 3 8   TOTAL BILIRUBIN mg/dL 0 42     Results from last 7 days   Lab Units 04/18/22  0453 04/17/22  0433 04/16/22  1201   GLUCOSE RANDOM mg/dL 101 106 94     Results from last 7 days   Lab Units 04/17/22  0433   HEMOGLOBIN A1C % 5 4   EAG mg/dl 108     Results from last 7 days   Lab Units 04/16/22  1625 04/16/22  1436 04/16/22  1201   HS TNI 0HR ng/L  --   --  6   HS TNI 2HR ng/L  --  8  --    HSTNI D2 ng/L  --  2  --    HS TNI 4HR ng/L 9  --   --    HSTNI D4 ng/L 3  --   --      Results from last 7 days   Lab Units 04/16/22  1201   PROTIME seconds 13 6   INR  1 08   PTT seconds 25     Results from last 7 days   Lab Units 04/16/22  1201   LIPASE u/L 64*       Results from last 7 days   Lab Units 04/16/22  1701   CLARITY UA  Clear   COLOR UA  Yellow   SPEC GRAV UA  >=1 030   PH UA  5 5   GLUCOSE UA mg/dl Negative   KETONES UA mg/dl Negative   BLOOD UA  Negative   PROTEIN UA mg/dl Trace*   NITRITE UA  Negative   BILIRUBIN UA  Negative   UROBILINOGEN UA E U /dl 0 2   LEUKOCYTES UA  Negative   WBC UA /hpf 0-1   RBC UA /hpf None Seen   BACTERIA UA /hpf None Seen   EPITHELIAL CELLS WET PREP /hpf None Seen   MUCUS THREADS  Occasional*         Medications:   Scheduled Medications:  heparin (porcine), 5,000 Units, Subcutaneous, Q8H [facility]      Continuous IV Infusions:     PRN Meds:       Discharge Plan: TBD     Network Utilization Review Department  ATTENTION: Please call with any questions or concerns to [phone] and carefully listen to the prompts so that you are directed to the right person  All voicemails are confidential   Lindy Palomares all requests for admission clinical reviews, approved or denied determinations and any other requests to dedicated fax number below belonging to the campus where the patient is receiving treatment   List of dedicated fax numbers for the Facilities:  [address] DENIALS (Administrative/Medical Necessity) [phone]   [address] (Maternity/NICU/Pediatrics) [phone]   [address]  [address] Medical [place] [address] [address] [place] [address] [address] [facility] [place] [address] [phone]

## 2022-04-18 NOTE — ASSESSMENT & PLAN NOTE
· Orthostatics normal  · Telemetry shows no arrhythmias  · Stress test negative - no reproducible ischemia  · Echo reviewed- no valvular abnormalities, EF preserved  · Continue telemetry -no arrhythmias seen

## 2022-04-19 ENCOUNTER — CLINICAL SUPPORT (OUTPATIENT)
Dept: CARDIOLOGY CLINIC | Facility: CLINIC | Age: 52
End: 2022-04-19
Payer: COMMERCIAL

## 2022-04-19 DIAGNOSIS — R55 SYNCOPE, UNSPECIFIED SYNCOPE TYPE: ICD-10-CM

## 2022-04-19 LAB
ARRHY DURING EX: NORMAL
CHEST PAIN STATEMENT: NORMAL
MAX DIASTOLIC BP: 88 MMHG
MAX HEART RATE: 150 BPM
MAX PREDICTED HEART RATE: 170 BPM
MAX. SYSTOLIC BP: 168 MMHG
PROTOCOL NAME: NORMAL
REASON FOR TERMINATION: NORMAL
TARGET HR FORMULA: NORMAL
TEST INDICATION: NORMAL
TIME IN EXERCISE PHASE: NORMAL

## 2022-04-19 PROCEDURE — 93246 EXT ECG>7D<15D RECORDING: CPT | Performed by: INTERNAL MEDICINE

## 2022-04-19 NOTE — PROGRESS NOTES
Pt was in the office today for a zio patch application instructed by Sharron Vaughn after he was discharged from hospital     zio patch was apply successfully and zio care instruction was explained to pt  Pt verbally understood

## 2022-05-11 ENCOUNTER — CLINICAL SUPPORT (OUTPATIENT)
Dept: CARDIOLOGY CLINIC | Facility: CLINIC | Age: 52
End: 2022-05-11
Payer: COMMERCIAL

## 2022-05-11 DIAGNOSIS — R55 SYNCOPE, UNSPECIFIED SYNCOPE TYPE: ICD-10-CM

## 2022-05-11 PROCEDURE — 93248 EXT ECG>7D<15D REV&INTERPJ: CPT | Performed by: INTERNAL MEDICINE

## 2022-06-06 ENCOUNTER — TELEPHONE (OUTPATIENT)
Dept: CARDIOLOGY CLINIC | Facility: CLINIC | Age: 52
End: 2022-06-06

## 2022-06-06 ENCOUNTER — OFFICE VISIT (OUTPATIENT)
Dept: CARDIOLOGY CLINIC | Facility: CLINIC | Age: 52
End: 2022-06-06
Payer: COMMERCIAL

## 2022-06-06 VITALS
SYSTOLIC BLOOD PRESSURE: 122 MMHG | HEIGHT: 68 IN | HEART RATE: 97 BPM | DIASTOLIC BLOOD PRESSURE: 76 MMHG | OXYGEN SATURATION: 97 % | BODY MASS INDEX: 20 KG/M2 | WEIGHT: 132 LBS | RESPIRATION RATE: 16 BRPM

## 2022-06-06 DIAGNOSIS — R55 VASOVAGAL SYNCOPE: Primary | ICD-10-CM

## 2022-06-06 PROCEDURE — 99213 OFFICE O/P EST LOW 20 MIN: CPT | Performed by: INTERNAL MEDICINE

## 2022-06-06 NOTE — TELEPHONE ENCOUNTER
Patient was seen by Dr Carlos Nguyen on 6/6/22  Dr Carlos Nguyen signed paperwork for him to return to work on 6/7/22  Patient left voicemail message stating that his employer is requiring the form be completed  Printed out form and given to Joshua

## 2022-06-06 NOTE — PROGRESS NOTES
CARDIOLOGY OFFICE VISIT  [facility]  [address], [facility] Maricarmen Nayak  Tel: [phone]      NAME: Ranjit Castro  AGE: 49 y o  SEX: male  : 1970   MRN: [id number]      Chief Complaint:  Chief Complaint   Patient presents with    Follow-up         History of Present Illness:   Patient comes for follow up s/p recent (22) hospitalization at [facility] for a likely vasovagal syncope  Patient has no significant past history  He was working at [employer] when he developed an upset stomach and went to the bathroom and had a large volume loose bowel movement  After returning to work he felt nauseous with epigastric pain and diaphoresis and while getting up to go to the bathroom lost consciousness  Denied abnormal body movements, tongue bite, loss of bladder or bowel control  Denies premonitory palpitations, CP      2D echocardiogram 2022 showed normal EF 60% with no RWMA  Exercise nuclear stress test was unremarkable 2022  Event monitor May 2022 showed sinus rhythm with no significant pauses or bradycardia  One 6 beat NSVT, 3 SVT episodes with max 5 beats    Today he states he is doing well from cardiac stand point and denies chest pain / pressure, SOB, palpitations, lightheadedness, syncope, swelling feet, orthopnea, PND, claudication  He exercises regularly without any limitation    Past Medical History:  Past Medical History:   Diagnosis Date    No pertinent past medical history          Past Surgical History:  Past Surgical History:   Procedure Laterality Date    VASECTOMY N/A          Family History:  No family history on file        Social History:  Social History     Socioeconomic History    Marital status:      Spouse name: None    Number of children: None    Years of education: None    Highest education level: None   Occupational History    None   Tobacco Use    Smoking status: Never Smoker    Smokeless tobacco: Never Used   Vaping Use    Vaping Use: Never used   Substance and Sexual Activity    Alcohol use: Yes     Comment: occasional    Drug use: Never    Sexual activity: Not Currently   Other Topics Concern    None   Social History Narrative    None     Social Determinants of Health     Financial Resource Strain: Not on file   Food Insecurity: Not on file   Transportation Needs: Not on file   Physical Activity: Not on file   Stress: Not on file   Social Connections: Not on file   Intimate Partner Violence: Not on file   Housing Stability: Not on file         Active Problems:  Patient Active Problem List   Diagnosis    Syncope    MARCIAL (acute kidney injury) ([facility] [place] [address] )    Leukocytosis         The following portions of the patient's history were reviewed and updated as appropriate: past medical history, past surgical history, past family history,  past social history, current medications, allergies and problem list       Review of Systems:  Constitutional: Denies fever, chills  Eyes: Denies eye redness, eye discharge  ENT: Denies hearing loss, sneezing, nasal discharge, sore throat   Respiratory: Denies cough, expectoration, shortness of breath  Cardiovascular: Denies chest pain, palpitations, lower extremity swelling  Gastrointestinal: Denies abdominal pain, nausea, vomiting, diarrhea  Genito-Urinary: Denies dysuria, incontinence  Musculoskeletal: Denies back pain, joint pain, muscle pain  Neurologic: Denies lightheadedness, syncope, headache, seizures  Endocrine: Denies polydipsia, temperature intolerance  Allergy and Immunology: Denies hives, insect bite sensitivity  Hematological and Lymphatic: Denies bleeding problems, swollen glands   Psychological: Denies depression, suicidal ideation, anxiety, panic  Dermatological: Denies pruritus, rash, skin lesion changes      Vitals:  Vitals:    06/06/22 0934   BP: 122/76   Pulse: 97   Resp: 16   SpO2: 97%       Body mass index is 20 07 kg/m²      Weight (last 2 days)     Date/Time Weight    06/06/22 0934 59 9 (132)            Physical Examination:  General: Patient is not in acute distress  Awake, alert, oriented in time, place and person  Responding to commands  Head: Normocephalic  Atraumatic  Eyes: Both pupils normal sized, round and reactive to light  Nonicteric  ENT: Normal external ear canals  Neck: Supple  JVP not raised  Trachea central  No thyromegaly  Lungs: Bilateral bronchovascular breath sounds with no crackles or rhonchi  Chest wall: No tenderness  Cardiovascular: RRR  S1 and S2 normal  No murmur, rub or gallop  Gastrointestinal: Abdomen soft, nontender  No guarding or rigidity  Liver and spleen not palpable  Bowel sounds present  Neurologic: Patient is awake, alert, oriented in time, place and person  Responding to commands  Moving all extremities  Integumentary:  No skin rash  Lymphatic: No cervical lymphadenopathy  Back: Symmetric  No CVA tenderness  Extremities: No clubbing, cyanosis or edema      Laboratory Results:  CBC with diff:   Lab Results   Component Value Date    WBC 8 48 04/17/2022    RBC 4 40 04/17/2022    HGB 13 3 04/17/2022    HCT 39 8 04/17/2022    MCV 91 04/17/2022    MCH 30 2 04/17/2022    RDW 12 8 04/17/2022     04/17/2022       CMP:  Lab Results   Component Value Date    CREATININE 1 36 (H) 04/18/2022    BUN 17 04/18/2022    K 3 8 04/18/2022     (H) 04/18/2022    CO2 27 04/18/2022    ALKPHOS 77 04/16/2022    ALT 41 04/16/2022    AST 32 04/16/2022       Lab Results   Component Value Date    HGBA1C 5 4 04/17/2022       No results found for: TROPONINI, CKMB, CKTOTAL    Lipid Profile:   No results found for: CHOL  Lab Results   Component Value Date    HDL 66 04/17/2022     Lab Results   Component Value Date    LDLCALC 129 (H) 04/17/2022     Lab Results   Component Value Date    TRIG 87 04/17/2022       Cardiac testing:   No results found for this or any previous visit      No results found for this or any previous visit  No results found for this or any previous visit  No valid procedures specified  Results for orders placed during the hospital encounter of 04/16/22    NM myocardial perfusion spect (stress and/or rest)    Interpretation Summary    Stress ECG: The stress ECG is negative for ischemia after maximal exercise, without reproduction of symptoms  Arrhythmias during stress: occasional PACs, occasional PVCs    Perfusion: There are no perfusion defects    Stress Function: Left ventricular function post-stress is normal  Post-stress ejection fraction is 47 %    Stress Combined Conclusion: The ECG and SPECT imaging portions of the stress study are concordant with no evidence of stress induced myocardial ischemia  Medications:  No current outpatient medications on file  Allergies:  No Known Allergies      Assessment and Plan:  1  Vasovagal syncope  Discussed the etiology of vasovagal syncope  Patient to keep himself well hydrated all the time  Seek medical help if any new symptoms    Discussed concepts of atherosclerosis, including signs and symptoms of cardiac disease  Previous studies were reviewed  Safety measures were reviewed  Questions were entertained and answered  Patient was advised to report any problems requiring medical attention  Follow-up with PCP and appropriate specialist and lab work as discussed  Return for follow up visit as scheduled or earlier, if needed  Thank you for allowing me to participate in the care and evaluation of your patient  Should you have any questions, please feel free to contact me        Leana Sacks, MD  6/6/2022,9:56 AM

## 2022-06-07 NOTE — TELEPHONE ENCOUNTER
Patient Aga Browne contacting office [phone] regarding status of his return to work paperwork needing to be signed by Dr Cuca Wolfe

## 2022-08-04 ENCOUNTER — TELEPHONE (OUTPATIENT)
Dept: CARDIOLOGY CLINIC | Facility: CLINIC | Age: 52
End: 2022-08-04

## 2022-08-04 NOTE — TELEPHONE ENCOUNTER
Call from Dr Argelai Obrien, Cardiologist asking to speak to Dr Tara Solares regarding mutual patient  Non-urgent but requesting a call today if possible  Cell: [phone]    Celoron text sent to Tara Solares

## 2022-09-13 NOTE — ED PROVIDER NOTES
History  Chief Complaint   Patient presents with    Abdominal Pain     pt brought in EMS for mid lower abdominal pain, nausea, and light headedness      53yo male with no significant past medical history presenting via EMS for evaluation after a syncopal episode  Patient woke up this morning with an upset stomach  He reports intermittent abdominal cramping and gas all morning  He was at work today and was talking to a co-worker about 30 minutes ago when he started to feel dizzy, nauseous, with associated tinnitus  He states his vision started to go black  Patient tried to get up to go to the trash can to vomit but his legs were weak  The next thing he remembers is waking up with his coworkers standing around him  He had a witnessed syncopal episode  Patient denies any chest pain or shortness of breath  He is currently feeling improved without any dizziness  History provided by:  Patient   used: No    Syncope  Episode history:  Single  Most recent episode: Today  Progression:  Resolved  Chronicity:  New  Witnessed: yes    Relieved by:  Nothing  Worsened by:  Nothing  Associated symptoms: dizziness and nausea    Associated symptoms: no chest pain, no confusion, no difficulty breathing, no fever, no focal sensory loss, no seizures, no shortness of breath and no vomiting        None       History reviewed  No pertinent past medical history  History reviewed  No pertinent surgical history  History reviewed  No pertinent family history  I have reviewed and agree with the history as documented  E-Cigarette/Vaping     E-Cigarette/Vaping Substances     Social History     Tobacco Use    Smoking status: Never Smoker    Smokeless tobacco: Never Used   Substance Use Topics    Alcohol use: Yes     Comment: occasional    Drug use: Never       Review of Systems   Constitutional: Negative for chills and fever  HENT: Negative for drooling and voice change      Eyes: Negative for discharge and redness  Respiratory: Negative for shortness of breath and stridor  Cardiovascular: Positive for syncope  Negative for chest pain and leg swelling  Gastrointestinal: Positive for diarrhea and nausea  Negative for abdominal pain and vomiting  Musculoskeletal: Negative for neck pain and neck stiffness  Skin: Negative for color change and rash  Neurological: Positive for dizziness  Negative for seizures and syncope  Psychiatric/Behavioral: Negative for confusion  The patient is not nervous/anxious  All other systems reviewed and are negative  Physical Exam  Physical Exam  Vitals and nursing note reviewed  Constitutional:       General: He is not in acute distress  Appearance: He is well-developed  He is not diaphoretic  HENT:      Head: Normocephalic and atraumatic  Right Ear: External ear normal       Left Ear: External ear normal    Eyes:      General: No scleral icterus  Right eye: No discharge  Left eye: No discharge  Conjunctiva/sclera: Conjunctivae normal    Cardiovascular:      Rate and Rhythm: Normal rate and regular rhythm  Heart sounds: Normal heart sounds  No murmur heard  Pulmonary:      Effort: Pulmonary effort is normal  No respiratory distress  Breath sounds: Normal breath sounds  No stridor  No wheezing or rales  Abdominal:      General: Bowel sounds are normal  There is no distension  Palpations: Abdomen is soft  Tenderness: There is no abdominal tenderness  There is no guarding  Musculoskeletal:         General: No deformity  Normal range of motion  Cervical back: Normal range of motion and neck supple  Skin:     General: Skin is warm and dry  Neurological:      Mental Status: He is alert  He is not disoriented  GCS: GCS eye subscore is 4  GCS verbal subscore is 5  GCS motor subscore is 6     Psychiatric:         Mood and Affect: Mood normal          Behavior: Behavior normal          Vital Signs  ED Triage Vitals [04/16/22 1115]   Temperature Pulse Respirations Blood Pressure SpO2   98 1 °F (36 7 °C) 84 18 117/70 97 %      Temp Source Heart Rate Source Patient Position - Orthostatic VS BP Location FiO2 (%)   Oral Monitor Lying Right arm --      Pain Score       --           Vitals:    04/16/22 1115 04/16/22 1130 04/16/22 1215 04/16/22 1230   BP: 117/70 111/61 123/58 125/66   Pulse: 84 84 67 81   Patient Position - Orthostatic VS: Lying Lying Lying Lying         Visual Acuity      ED Medications  Medications   sodium chloride 0 9 % bolus 1,000 mL (1,000 mL Intravenous New Bag 4/16/22 1209)   aspirin chewable tablet 324 mg (324 mg Oral Given 4/16/22 1209)       Diagnostic Studies  Results Reviewed     Procedure Component Value Units Date/Time    HS Troponin I 2hr [[id number]]     Lab Status: No result Specimen: Blood     HS Troponin I 4hr [[id number]]     Lab Status: No result Specimen: Blood     HS Troponin 0hr (reflex protocol) [[id number]]  (Normal) Collected: 04/16/22 1201    Lab Status: Final result Specimen: Blood from Arm, Left Updated: 04/16/22 1234     hs TnI 0hr 6 ng/L     Comprehensive metabolic panel [[id number]]  (Abnormal) Collected: 04/16/22 1201    Lab Status: Final result Specimen: Blood from Arm, Left Updated: 04/16/22 1224     Sodium 142 mmol/L      Potassium 4 3 mmol/L      Chloride 109 mmol/L      CO2 26 mmol/L      ANION GAP 7 mmol/L      BUN 27 mg/dL      Creatinine 1 68 mg/dL      Glucose 94 mg/dL      Calcium 9 1 mg/dL      AST 32 U/L      ALT 41 U/L      Alkaline Phosphatase 77 U/L      Total Protein 7 5 g/dL      Albumin 3 8 g/dL      Total Bilirubin 0 42 mg/dL      eGFR 46 ml/min/1 73sq m     Narrative:      [place] guidelines for Chronic Kidney Disease (CKD):     Stage 1 with normal or high GFR (GFR > 90 mL/min/1 73 square meters)    Stage 2 Mild CKD (GFR = 60-89 mL/min/1 73 square meters)    Stage 3A Moderate CKD (GFR = 45-59 mL/min/1 73 square meters)   Stage 3B Moderate CKD (GFR = 30-44 mL/min/1 73 square meters)    Stage 4 Severe CKD (GFR = 15-29 mL/min/1 73 square meters)    Stage 5 End Stage CKD (GFR <15 mL/min/1 73 square meters)  Note: GFR calculation is accurate only with a steady state creatinine    Lipase [[id number]]  (Abnormal) Collected: 04/16/22 1201    Lab Status: Final result Specimen: Blood from Arm, Left Updated: 04/16/22 1224     Lipase 64 u/L     Protime-INR [[id number]]  (Normal) Collected: 04/16/22 1201    Lab Status: Final result Specimen: Blood from Arm, Left Updated: 04/16/22 1222     Protime 13 6 seconds      INR 1 08    APTT [[id number]]  (Normal) Collected: 04/16/22 1201    Lab Status: Final result Specimen: Blood from Arm, Left Updated: 04/16/22 1222     PTT 25 seconds     CBC and differential [[id number]]  (Abnormal) Collected: 04/16/22 1200    Lab Status: Final result Specimen: Blood from Arm, Left Updated: 04/16/22 1208     WBC 14 16 Thousand/uL      RBC 4 63 Million/uL      Hemoglobin 13 8 g/dL      Hematocrit 42 1 %      MCV 91 fL      MCH 29 8 pg      MCHC 32 8 g/dL      RDW 12 7 %      MPV 9 6 fL      Platelets 669 Thousands/uL      nRBC 0 /100 WBCs      Neutrophils Relative 86 %      Immat GRANS % 0 %      Lymphocytes Relative 6 %      Monocytes Relative 7 %      Eosinophils Relative 1 %      Basophils Relative 0 %      Neutrophils Absolute 12 09 Thousands/µL      Immature Grans Absolute 0 04 Thousand/uL      Lymphocytes Absolute 0 91 Thousands/µL      Monocytes Absolute 1 03 Thousand/µL      Eosinophils Absolute 0 07 Thousand/µL      Basophils Absolute 0 02 Thousands/µL                  XR chest 1 view portable    (Results Pending)              Procedures  ECG 12 Lead Documentation Only    Date/Time: 4/16/2022 3:12 PM  Performed by: Jessica Bobby PA-C  Authorized by: Jessica Bobby PA-C     Indications / Diagnosis:  Syncope  ECG reviewed by me, the ED Provider: yes    Patient location:  ED  Interpretation: Interpretation: abnormal    Rate:     ECG rate:  74    ECG rate assessment: normal    Rhythm:     Rhythm: sinus rhythm    Ectopy:     Ectopy: none    QRS:     QRS axis:  Normal  Conduction:     Conduction: normal    ST segments:     ST segments:  Abnormal    Elevation:  I and aVL    Depression:  III  T waves:     T waves: non-specific               ED Course  ED Course as of 04/16/22 1258   Sat Apr 16, 2022   1204 Concern for ST elevations in I and AVL on EKG  Discussed case with interventional cardiologist, Dr Davis, who feels the EKG changes more likely represent early repolarization  He recommends checking troponin and repeat EKG  1226 Creatinine(!): 1 68  No prior labs to compare to  MDM  Number of Diagnoses or Management Options  Abnormal EKG: new and requires workup  Acute kidney injury [facility]): new and requires workup  Syncope: new and requires workup  Diagnosis management comments: 55yo male presenting after a witnessed syncopal event at work today  Has been having upset stomach, nausea, and gas all day today  Syncope preceded by nausea, tinnitus, and feeling his vision go black  No CP/SOB  Currently back to baseline  Patient is afebrile and hemodynamically stable  Initial ED plan: Check abdominal labs, troponin/EKG, and CXR  IV fluid bolus  Final assessment: EKG obtained with ST elevations in I and AVL  No prior EKGs to compare to  EKG reviewed with interventional cardiologist, Ana María Antunez, who states the EKG findings appear to be early repolarization  High sensitivity troponin is 6  Creatinine 1 68, no prior labs to compare to  Given abnormal EKG and syncope, will admit for observation          Amount and/or Complexity of Data Reviewed  Clinical lab tests: ordered and reviewed  Tests in the radiology section of CPT®: ordered and reviewed  Tests in the medicine section of CPT®: reviewed and ordered  Review and summarize past medical records: yes  Independent visualization of images, tracings, or specimens: yes    Risk of Complications, Morbidity, and/or Mortality  Presenting problems: high  Diagnostic procedures: moderate  Management options: moderate    Patient Progress  Patient progress: stable      Disposition  Final diagnoses:   Syncope   Abnormal EKG   Acute kidney injury ([facility] [address] )     Time reflects when diagnosis was documented in both MDM as applicable and the Disposition within this note     Time User Action Codes Description Comment    4/16/2022 12:02 PM [address] [place], [place] [address]y, [place] [[id number]] Syncope     4/16/2022 12:34 PM Arthur Kenney [R94 [id number]] Abnormal EKG     4/16/2022 12:58 PM Jina Kenney [facility] [N17 [id number]] Acute kidney injury [facility])       ED Disposition     ED Disposition Condition Date/Time Comment    Admit Stable Sat Apr 16, 2022 12:57 PM Case was discussed with Dr Ander Garcia and the patient's admission status was agreed to be Admission Status: observation status to the service of Dr Ander Garcia   Follow-up Information    None         Patient's Medications    No medications on file       No discharge procedures on file      PDMP Review     None          ED Provider  Electronically Signed by           Moy Nuñez PA-C  04/16/22 9467 [place] Counseling:  I discussed with the patient the risks of imiquimod including but not limited to erythema, scaling, itching, weeping, crusting, and pain.  Patient understands that the inflammatory response to imiquimod is variable from person to person and was educated regarded proper titration schedule.  If flu-like symptoms develop, patient knows to discontinue the medication and contact us.

## 2024-02-09 NOTE — ASSESSMENT & PLAN NOTE
Increase PRAZOSIN to 2-3mg NIGHTLY  Increase PROPRANOLOL to up to three times per day as needed for anxiety  Continue other medications as prescribed    Patient presents with abdominal pain, nausea, profuse watery BM followed by syncope while getting to vomit  Suspect orthostatic hypotension versus Arrhythmia versus vagal  -cardiac monitoring  -Trend troponin  -check orthostatic blood pressure  -Fall precaution  -IV fluids  -check echo  -cardiology consult

## 2024-12-04 ENCOUNTER — TELEPHONE (OUTPATIENT)
Age: 54
End: 2024-12-04

## 2024-12-04 ENCOUNTER — OFFICE VISIT (OUTPATIENT)
Dept: FAMILY MEDICINE CLINIC | Facility: CLINIC | Age: 54
End: 2024-12-04
Payer: COMMERCIAL

## 2024-12-04 VITALS
SYSTOLIC BLOOD PRESSURE: 120 MMHG | HEART RATE: 67 BPM | HEIGHT: 68 IN | WEIGHT: 290 LBS | DIASTOLIC BLOOD PRESSURE: 70 MMHG | OXYGEN SATURATION: 97 % | BODY MASS INDEX: 43.95 KG/M2

## 2024-12-04 DIAGNOSIS — E66.01 CLASS 3 SEVERE OBESITY WITH BODY MASS INDEX (BMI) OF 40.0 TO 44.9 IN ADULT, UNSPECIFIED OBESITY TYPE, UNSPECIFIED WHETHER SERIOUS COMORBIDITY PRESENT (HCC): Primary | ICD-10-CM

## 2024-12-04 DIAGNOSIS — Z12.11 SCREENING FOR COLON CANCER: ICD-10-CM

## 2024-12-04 DIAGNOSIS — Z83.3 FAMILY HISTORY OF DIABETES MELLITUS IN FATHER: ICD-10-CM

## 2024-12-04 DIAGNOSIS — E66.813 CLASS 3 SEVERE OBESITY WITH BODY MASS INDEX (BMI) OF 40.0 TO 44.9 IN ADULT, UNSPECIFIED OBESITY TYPE, UNSPECIFIED WHETHER SERIOUS COMORBIDITY PRESENT (HCC): Primary | ICD-10-CM

## 2024-12-04 DIAGNOSIS — G47.39 OTHER SLEEP APNEA: ICD-10-CM

## 2024-12-04 DIAGNOSIS — Z00.00 HEALTHCARE MAINTENANCE: ICD-10-CM

## 2024-12-04 DIAGNOSIS — Z76.89 ESTABLISHING CARE WITH NEW DOCTOR, ENCOUNTER FOR: ICD-10-CM

## 2024-12-04 PROCEDURE — 99204 OFFICE O/P NEW MOD 45 MIN: CPT | Performed by: NURSE PRACTITIONER

## 2024-12-04 NOTE — TELEPHONE ENCOUNTER
Patient called stating he was just seen by provider Hansa Molina and he forgot to mention that he has sleep apnea and uses a CPAP machine. He sattes that his machine is over 10 years old and has not used it in a month since his mask is no longer good. He would like to know if PCP can order him a new CPAP machine? Please advise.        Patient can be reached at [phone]

## 2024-12-04 NOTE — ASSESSMENT & PLAN NOTE
Will refer to sleep medicine for evaluation and updated CPAP.    Orders:    Ambulatory Referral to Sleep Medicine; Future

## 2024-12-04 NOTE — PROGRESS NOTES
Name: Fabi Reed      : 1970      MRN: [id number]  Encounter Provider: NIYAH Lopez  Encounter Date: 2024   Encounter department: [facility] [address]  :  Assessment & Plan  Class 3 severe obesity with body mass index (BMI) of 40.0 to 44.9 in adult, unspecified obesity type, unspecified whether serious comorbidity present (HCC)  Patient notes family history of obesity and diabetes.  Is looking to get back into exercise and physical activity.  Advised patient to obtain blood work as ordered.  Will follow-up after obtaining blood work.    Orders:    Lipid Panel with Direct LDL reflex; Future    TSH, 3rd generation with Free T4 reflex; Future    Hemoglobin A1C; Future    Comprehensive metabolic panel; Future    CBC and differential; Future    Family history of diabetes mellitus in father  Denies symptoms related to elevated blood sugars.  To obtain blood work as ordered.    Orders:    Hemoglobin A1C; Future    Comprehensive metabolic panel; Future    Other sleep apnea  Will refer to sleep medicine for evaluation and updated CPAP.    Orders:    Ambulatory Referral to Sleep Medicine; Future    Screening for colon cancer    Orders:    Ambulatory Referral to Gastroenterology; Future    Healthcare maintenance    Orders:    Lipid Panel with Direct LDL reflex; Future    Vitamin D 25 hydroxy; Future    TSH, 3rd generation with Free T4 reflex; Future    Hemoglobin A1C; Future    Comprehensive metabolic panel; Future    CBC and differential; Future    Establishing care with new doctor, encounter for               Depression Screening and Follow-up Plan: Patient was screened for depression during today's encounter. They screened negative with a PHQ-2 score of 0.      History of Present Illness     Patient presents to the office for establishment of care.  Patient has not had primary care in several years.  Denies significant medical or surgical history.  Family  history of diabetes in father.  Did have a hospitalization back in 2022 due to syncopal episode at work.  Was noted to have irregular heart rate and acute kidney injury from dehydration.  Patient did follow-up with cardiology at that time, but has not had any primary care follow-up since.  Patient denies chest pain, palpitations, syncope, weakness, dizziness.      Review of Systems   Constitutional:  Negative for activity change, appetite change and fatigue.   HENT:  Negative for congestion, ear pain, sore throat and trouble swallowing.    Eyes:  Negative for photophobia and visual disturbance.   Respiratory:  Negative for cough, chest tightness and shortness of breath.    Cardiovascular:  Negative for chest pain and palpitations.   Gastrointestinal:  Negative for abdominal pain, blood in stool, nausea and vomiting.   Endocrine: Negative for polydipsia, polyphagia and polyuria.   Genitourinary:  Negative for decreased urine volume, hematuria and testicular pain.   Musculoskeletal:  Negative for arthralgias and myalgias.   Skin:  Negative for color change and rash.   Neurological:  Negative for dizziness, weakness, light-headedness and headaches.   Hematological:  Negative for adenopathy. Does not bruise/bleed easily.   Psychiatric/Behavioral:  Negative for dysphoric mood. The patient is not nervous/anxious.      Past Medical History   Past Medical History:   Diagnosis Date    History of irregular heartbeat     No pertinent past medical history      Past Surgical History:   Procedure Laterality Date    VASECTOMY N/A      Family History   Problem Relation Age of Onset    No Known Problems Mother     Heart failure Father     Diabetes Father       reports that he has never smoked. He has never been exposed to tobacco smoke. He has never used smokeless tobacco. He reports current alcohol use. He reports that he does not use drugs.  No current outpatient medications on file prior to visit.     No current  "facility-administered medications on file prior to visit.   No Known Allergies        Objective   /70 (BP Location: Left arm, Patient Position: Sitting, Cuff Size: Large)   Pulse 67   Ht 5' 7.5\" (1.715 m)   Wt 132 kg (290 lb)   SpO2 97%   BMI 44.75 kg/m²      Physical Exam  Vitals reviewed.   Constitutional:       General: He is not in acute distress.     Appearance: He is obese. He is not ill-appearing.   HENT:      Head: Normocephalic and atraumatic.      Right Ear: Tympanic membrane, ear canal and external ear normal.      Left Ear: Tympanic membrane, ear canal and external ear normal.      Nose: Nose normal.      Mouth/Throat:      Mouth: Mucous membranes are moist.      Pharynx: Oropharynx is clear.   Eyes:      Pupils: Pupils are equal, round, and reactive to light.   Cardiovascular:      Rate and Rhythm: Normal rate and regular rhythm.      Pulses: Normal pulses.      Heart sounds: Normal heart sounds. No murmur heard.  Pulmonary:      Effort: Pulmonary effort is normal.      Breath sounds: Normal breath sounds.   Musculoskeletal:         General: Normal range of motion.      Cervical back: Normal range of motion and neck supple.   Skin:     General: Skin is warm and dry.   Neurological:      General: No focal deficit present.      Mental Status: He is alert and oriented to person, place, and time.   Psychiatric:         Mood and Affect: Mood normal.         Behavior: Behavior normal.         "

## 2024-12-04 NOTE — TELEPHONE ENCOUNTER
Please inform patient that I placed a referral for sleep medicine.  He will need to be connected to them for an evaluation and CPAP maintenance.  Provide patient with office number if needed.  Thank you

## 2025-01-06 ENCOUNTER — TELEPHONE (OUTPATIENT)
Age: 55
End: 2025-01-06

## 2025-01-06 NOTE — TELEPHONE ENCOUNTER
Pt wife called and states they scheduled an appt with Sleep Medicine but its for May. Pts CPAP machine is very old and mask isnt working so they are wondering if there is anything we can do for him before May. He doesn't want to wait that long with his mask not functioning properly while trying to sleep at night.     Please advise, thanks.

## 2025-01-07 NOTE — TELEPHONE ENCOUNTER
We will need additional information from the patient in order to attempt to renew his CPAP machine.  We need his settings.  Please have him give this information.  Thank you

## 2025-01-08 NOTE — TELEPHONE ENCOUNTER
We cannot re-order a CPAP machine without the settings.  He would need to call either the company who gave him the CPAP or where he had the sleep study done.

## 2025-01-29 LAB
DME PARACHUTE DELIVERY DATE ACTUAL: NORMAL
DME PARACHUTE DELIVERY DATE REQUESTED: NORMAL
DME PARACHUTE ITEM DESCRIPTION: NORMAL
DME PARACHUTE ORDER STATUS: NORMAL
DME PARACHUTE SUPPLIER NAME: NORMAL
DME PARACHUTE SUPPLIER PHONE: NORMAL

## 2025-03-20 ENCOUNTER — TELEPHONE (OUTPATIENT)
Age: 55
End: 2025-03-20

## 2025-03-20 ENCOUNTER — PREP FOR PROCEDURE (OUTPATIENT)
Age: 55
End: 2025-03-20

## 2025-03-20 DIAGNOSIS — Z12.11 SCREENING FOR COLON CANCER: Primary | ICD-10-CM

## 2025-03-20 NOTE — TELEPHONE ENCOUNTER
03/20/25  Screened by: Danita Hinkle MA    Referring Provider NIYAH Molina    Pre- Screening:     There is no height or weight on file to calculate BMI.  Has patient been referred for a routine screening Colonoscopy? yes  Is the patient between 45-75 years old? yes      Previous Colonoscopy no   If yes:    Date:     Facility:     Reason:       Does the patient want to see a Gastroenterologist prior to their procedure OR are they having any GI symptoms? no    Has the patient been hospitalized or had abdominal surgery in the past 6 months? no    Does the patient use supplemental oxygen? no    Does the patient take Coumadin, Lovenox, Plavix, Elliquis, Xarelto, or other blood thinning medication? no    Has the patient had a stroke, cardiac event, or stent placed in the past year? no      If patient is between 45yrs - 49yrs, please advise patient that we will have to confirm benefits & coverage with their insurance company for a routine screening colonoscopy.

## 2025-03-20 NOTE — TELEPHONE ENCOUNTER
Scheduled date of colonoscopy (as of today): 3/31/25  Physician performing colonoscopy: Dr. Echols  Location of colonoscopy: [facility]  Bowel prep reviewed with patient: britney  Instructions reviewed with patient by: Danita HOWE, sent via email at [email or web address] per pt  Clearances: N/A

## 2025-03-28 ENCOUNTER — TELEPHONE (OUTPATIENT)
Age: 55
End: 2025-03-28

## 2025-03-28 NOTE — TELEPHONE ENCOUNTER
Pt returning missed call , no VM left . Advised pt calls will start between 2pm and 6 pm to discuss arrival time for procedure on 03/31.

## 2025-03-31 ENCOUNTER — HOSPITAL ENCOUNTER (OUTPATIENT)
Dept: GASTROENTEROLOGY | Facility: HOSPITAL | Age: 55
Setting detail: OUTPATIENT SURGERY
Discharge: HOME | End: 2025-03-31
Attending: INTERNAL MEDICINE
Payer: COMMERCIAL

## 2025-03-31 ENCOUNTER — ANESTHESIA EVENT (OUTPATIENT)
Dept: GASTROENTEROLOGY | Facility: HOSPITAL | Age: 55
End: 2025-03-31
Payer: COMMERCIAL

## 2025-03-31 ENCOUNTER — ANESTHESIA (OUTPATIENT)
Dept: GASTROENTEROLOGY | Facility: HOSPITAL | Age: 55
End: 2025-03-31
Payer: COMMERCIAL

## 2025-03-31 VITALS
TEMPERATURE: 98 F | WEIGHT: 290.13 LBS | BODY MASS INDEX: 43.97 KG/M2 | DIASTOLIC BLOOD PRESSURE: 64 MMHG | SYSTOLIC BLOOD PRESSURE: 118 MMHG | HEART RATE: 53 BPM | HEIGHT: 68 IN | RESPIRATION RATE: 18 BRPM | OXYGEN SATURATION: 96 %

## 2025-03-31 DIAGNOSIS — Z12.11 SCREENING FOR COLON CANCER: ICD-10-CM

## 2025-03-31 PROCEDURE — 88305 TISSUE EXAM BY PATHOLOGIST: CPT | Performed by: STUDENT IN AN ORGANIZED HEALTH CARE EDUCATION/TRAINING PROGRAM

## 2025-03-31 PROCEDURE — 45385 COLONOSCOPY W/LESION REMOVAL: CPT | Performed by: INTERNAL MEDICINE

## 2025-03-31 RX ORDER — SODIUM CHLORIDE, SODIUM LACTATE, POTASSIUM CHLORIDE, CALCIUM CHLORIDE 600; 310; 30; 20 MG/100ML; MG/100ML; MG/100ML; MG/100ML
INJECTION, SOLUTION INTRAVENOUS CONTINUOUS PRN
Status: DISCONTINUED | OUTPATIENT
Start: 2025-03-31 | End: 2025-03-31

## 2025-03-31 RX ORDER — LIDOCAINE HYDROCHLORIDE 20 MG/ML
INJECTION, SOLUTION EPIDURAL; INFILTRATION; INTRACAUDAL; PERINEURAL AS NEEDED
Status: DISCONTINUED | OUTPATIENT
Start: 2025-03-31 | End: 2025-03-31

## 2025-03-31 RX ORDER — ONDANSETRON 2 MG/ML
4 INJECTION INTRAMUSCULAR; INTRAVENOUS ONCE AS NEEDED
Status: CANCELLED | OUTPATIENT
Start: 2025-03-31

## 2025-03-31 RX ORDER — SODIUM CHLORIDE, SODIUM LACTATE, POTASSIUM CHLORIDE, CALCIUM CHLORIDE 600; 310; 30; 20 MG/100ML; MG/100ML; MG/100ML; MG/100ML
20 INJECTION, SOLUTION INTRAVENOUS CONTINUOUS
Status: CANCELLED | OUTPATIENT
Start: 2025-03-31

## 2025-03-31 RX ORDER — PROPOFOL 10 MG/ML
INJECTION, EMULSION INTRAVENOUS AS NEEDED
Status: DISCONTINUED | OUTPATIENT
Start: 2025-03-31 | End: 2025-03-31

## 2025-03-31 RX ADMIN — PROPOFOL 60 MG: 10 INJECTION, EMULSION INTRAVENOUS at 09:52

## 2025-03-31 RX ADMIN — PROPOFOL 40 MG: 10 INJECTION, EMULSION INTRAVENOUS at 09:54

## 2025-03-31 RX ADMIN — LIDOCAINE HYDROCHLORIDE 100 MG: 20 INJECTION, SOLUTION EPIDURAL; INFILTRATION; INTRACAUDAL; PERINEURAL at 09:50

## 2025-03-31 RX ADMIN — PROPOFOL 40 MG: 10 INJECTION, EMULSION INTRAVENOUS at 09:58

## 2025-03-31 RX ADMIN — PROPOFOL 60 MG: 10 INJECTION, EMULSION INTRAVENOUS at 09:56

## 2025-03-31 RX ADMIN — PROPOFOL 100 MG: 10 INJECTION, EMULSION INTRAVENOUS at 09:50

## 2025-03-31 RX ADMIN — SODIUM CHLORIDE, SODIUM LACTATE, POTASSIUM CHLORIDE, AND CALCIUM CHLORIDE: .6; .31; .03; .02 INJECTION, SOLUTION INTRAVENOUS at 09:48

## 2025-03-31 NOTE — ANESTHESIA POSTPROCEDURE EVALUATION
Post-Op Assessment Note    CV Status:  Stable    Pain management: adequate       Mental Status:  Arousable   Hydration Status:  Stable   PONV Controlled:  None   Airway Patency:  Patent     Post Op Vitals Reviewed: Yes    No anethesia notable event occurred.    Staff: Anesthesiologist, CRNA           Last Filed PACU Vitals:  Vitals Value Taken Time   Temp 98.0    Pulse 62    /63    Resp 16    SpO2 95%

## 2025-03-31 NOTE — ANESTHESIA PREPROCEDURE EVALUATION
Procedure:  COLONOSCOPY    Relevant Problems   /RENAL   (+) MARCIAL (acute kidney injury) (HCC)      PULMONARY   (+) Other sleep apnea      BMI 44  Physical Exam    Airway    Mallampati score: II  TM Distance: >3 FB  Neck ROM: full     Dental        Cardiovascular      Pulmonary      Other Findings        Anesthesia Plan  ASA Score- 3     Anesthesia Type- IV sedation with anesthesia with ASA Monitors.         Additional Monitors:     Airway Plan:            Plan Factors-Exercise tolerance (METS): >4 METS.    Chart reviewed.   Existing labs reviewed. Patient summary reviewed.                  Induction- intravenous.    Postoperative Plan-         Informed Consent- Anesthetic plan and risks discussed with patient.  I personally reviewed this patient with the CRNA. Discussed and agreed on the Anesthesia Plan with the CRNA..      NPO Status:  No vitals data found for the desired time range.

## 2025-03-31 NOTE — H&P
"History and Physical -  [facility]  Fabi Reed 54 y.o. male MRN: [id number]                  HPI: Fabi Reed is a 54 y.o. year old male who presents for colonoscopy for colon cancer screening      REVIEW OF SYSTEMS: Per the HPI, and otherwise unremarkable.    Historical Information   Past Medical History:   Diagnosis Date    History of irregular heartbeat     No pertinent past medical history      Past Surgical History:   Procedure Laterality Date    VASECTOMY N/A      Social History   Social History     Substance and Sexual Activity   Alcohol Use Yes    Comment: occasional     Social History     Substance and Sexual Activity   Drug Use Never     Social History     Tobacco Use   Smoking Status Never    Passive exposure: Never   Smokeless Tobacco Never     Family History   Problem Relation Age of Onset    No Known Problems Mother     Heart failure Father     Diabetes Father        Meds/Allergies     No current outpatient medications on file.    No Known Allergies    Objective     /72   Pulse 61   Temp 98 °F (36.7 °C) (Temporal)   Resp 16   Ht 5' 8\" (1.727 m)   Wt 132 kg (290 lb 2 oz)   SpO2 97%   BMI 44.11 kg/m²       PHYSICAL EXAM    Gen: NAD  Head: NCAT  CV: RRR  CHEST: Clear  ABD: soft, NT/ND  EXT: no edema      ASSESSMENT/PLAN:  Fabi Reed is a 54 y.o. year old male who presents for colonoscopy for colon cancer screening. The patient is stable and optimized for the procedure, we reviewed risk and benefits. Risk include but not limited to infection, bleeding, perforation and missing a lesion.          "

## 2025-04-03 ENCOUNTER — RESULTS FOLLOW-UP (OUTPATIENT)
Dept: GASTROENTEROLOGY | Facility: CLINIC | Age: 55
End: 2025-04-03

## 2025-04-03 PROCEDURE — 88305 TISSUE EXAM BY PATHOLOGIST: CPT | Performed by: STUDENT IN AN ORGANIZED HEALTH CARE EDUCATION/TRAINING PROGRAM

## 2025-04-22 ENCOUNTER — TELEPHONE (OUTPATIENT)
Dept: NEUROLOGY | Facility: CLINIC | Age: 55
End: 2025-04-22

## 2025-04-22 NOTE — TELEPHONE ENCOUNTER
Attempted to call the patient to confirm his upcoming appointment with Dr. Ruiz. Voice Mail box not set up yet. I was unbale to leave a message

## 2025-05-06 ENCOUNTER — TELEPHONE (OUTPATIENT)
Dept: NEUROLOGY | Facility: CLINIC | Age: 55
End: 2025-05-06

## 2025-05-06 NOTE — TELEPHONE ENCOUNTER
Attempted to confirm appointment with Dr. Ruiz for 5/7/25 at 10:40 AM but phone wasn't taking messages.